# Patient Record
Sex: MALE | Race: WHITE
[De-identification: names, ages, dates, MRNs, and addresses within clinical notes are randomized per-mention and may not be internally consistent; named-entity substitution may affect disease eponyms.]

---

## 2017-08-09 LAB
ANION GAP SERPL CALC-SCNC: 3 MMOL/L (ref 3–11)
APPEARANCE UR: CLEAR
BASOPHILS # BLD: 0.03 K/UL (ref 0–0.2)
BASOPHILS NFR BLD: 0.4 %
BILIRUB UR-MCNC: (no result) MG/DL
BUN SERPL-MCNC: 16 MG/DL (ref 7–18)
BUN/CREAT SERPL: 16 (ref 10–20)
CALCIUM SERPL-MCNC: 8.4 MG/DL (ref 8.5–10.1)
CHLORIDE SERPL-SCNC: 111 MMOL/L (ref 98–107)
CO2 SERPL-SCNC: 31 MMOL/L (ref 21–32)
COLOR UR: YELLOW
COMPLETE: YES
CREAT CL PREDICTED SERPL C-G-VRATE: 96.8 ML/MIN
CREAT SERPL-MCNC: 1 MG/DL (ref 0.6–1.4)
EOSINOPHIL NFR BLD AUTO: 184 K/UL (ref 130–400)
GLUCOSE SERPL-MCNC: 87 MG/DL (ref 70–99)
HCT VFR BLD CALC: 41.7 % (ref 42–52)
IG%: 0.3 %
IMM GRANULOCYTES NFR BLD AUTO: 27.2 %
LYMPHOCYTES # BLD: 1.89 K/UL (ref 1.2–3.4)
MANUAL MICROSCOPIC REQUIRED?: NO
MCH RBC QN AUTO: 32 PG (ref 25–34)
MCHC RBC AUTO-ENTMCNC: 33.3 G/DL (ref 32–36)
MCV RBC AUTO: 95.9 FL (ref 80–100)
MONOCYTES NFR BLD: 8.5 %
NEUTROPHILS # BLD AUTO: 3.7 %
NEUTROPHILS NFR BLD AUTO: 59.9 %
NITRITE UR QL STRIP: (no result)
PH UR STRIP: 5.5 [PH] (ref 4.5–7.5)
PMV BLD AUTO: 12.3 FL (ref 7.4–10.4)
POTASSIUM SERPL-SCNC: 3.6 MMOL/L (ref 3.5–5.1)
RBC # BLD AUTO: 4.35 M/UL (ref 4.7–6.1)
REVIEW REQ?: NO
SODIUM SERPL-SCNC: 145 MMOL/L (ref 136–145)
SP GR UR STRIP: 1.03 (ref 1–1.03)
URINE BILL WITH OR WITHOUT MIC: (no result)
UROBILINOGEN UR-MCNC: (no result) MG/DL
WBC # BLD AUTO: 6.95 K/UL (ref 4.8–10.8)
ZZUR CULT IF INDIC CLEAN CATCH: NO

## 2017-08-09 NOTE — DIAGNOSTIC IMAGING REPORT
CHEST 2 VIEWS ROUTINE



CLINICAL HISTORY: PAT preoperative evaluation



COMPARISON STUDY:  4/1/2012



FINDINGS: The bones soft tissues and hemidiaphragms are normal. The

cardiomediastinal silhouette is normal. The lungs are clear. The pulmonary

vasculature is normal. Several metallic coils left lung base.



IMPRESSION:  No acute process. 











The above report was generated using voice recognition software.  It may contain

grammatical, syntax or spelling errors.









Electronically signed by:  Dante Barnett M.D.

8/9/2017 3:24 PM



Dictated Date/Time:  8/9/2017 3:22 PM

## 2017-08-09 NOTE — PAT MEDICATION INSTRUCTIONS
Service Date


Aug 9, 2017.





Current Home Medication List


Amoxicillin (Amoxil), 500 MG PO UD PRN for DENTAL PROCEDURE


Aspirin Enteric Coated (Ecotrin Or Generic), 81 MG PO QAM


Cyanocobalamin (Vitamin B12), 1 TAB PO QAM


Ferrous Sulfate (Iron), 1 TAB PO HS


Losartan Potassium (Cozaar), 50 MG PO QAM


Multivitamin (Multivitamin), 1 TAB PO QAM


Pravastatin (Pravachol ), 10 MG PO HS


Ranitidine (Zantac), 150 MG PO PRN


Sulindac (Sulindac), 1 TAB PO HS


[Zinc], 50 MG PO HS





Medication Instructions


For Your Scheduled Surgery 





Amoxicillin (Amoxil), 500 MG PO UD PRN for DENTAL PROCEDURE 








- Check with surgeon for instructions: 


Sulindac (Sulindac), 1 TAB PO HS








- Hold the following medications the morning of surgery:


Ranitidine (Zantac), 150 MG PO PRN


Multivitamin (Multivitamin), 1 TAB PO QAM


Losartan Potassium (Cozaar), 50 MG PO QAM


Cyanocobalamin (Vitamin B12), 1 TAB PO QAM








- Take the following medications the morning of surgery with a sip of water:


Aspirin Enteric Coated (Ecotrin Or Generic), 81 MG PO QAM (okay to continue per 

surgeon)








- Take the following medications as scheduled the night before surgery:


[Zinc], 50 MG PO HS


Pravastatin (Pravachol ), 10 MG PO HS


Ranitidine (Zantac), 150 MG PO PRN (if needed)


Ferrous Sulfate (Iron), 1 TAB PO HS








If you have any questions please call us at [phone] or [phone] or 

[phone]

## 2017-08-30 ENCOUNTER — HOSPITAL ENCOUNTER (INPATIENT)
Dept: HOSPITAL 45 - C.ACU | Age: 62
LOS: 2 days | Discharge: HOME | DRG: 460 | End: 2017-09-01
Attending: ORTHOPAEDIC SURGERY | Admitting: ORTHOPAEDIC SURGERY
Payer: COMMERCIAL

## 2017-08-30 VITALS
DIASTOLIC BLOOD PRESSURE: 80 MMHG | SYSTOLIC BLOOD PRESSURE: 143 MMHG | HEART RATE: 80 BPM | OXYGEN SATURATION: 93 % | TEMPERATURE: 97.52 F

## 2017-08-30 VITALS
HEART RATE: 67 BPM | DIASTOLIC BLOOD PRESSURE: 74 MMHG | SYSTOLIC BLOOD PRESSURE: 115 MMHG | TEMPERATURE: 97.34 F | OXYGEN SATURATION: 95 %

## 2017-08-30 VITALS
SYSTOLIC BLOOD PRESSURE: 149 MMHG | HEART RATE: 72 BPM | DIASTOLIC BLOOD PRESSURE: 81 MMHG | TEMPERATURE: 97.7 F | OXYGEN SATURATION: 95 %

## 2017-08-30 VITALS
OXYGEN SATURATION: 93 % | TEMPERATURE: 97.52 F | DIASTOLIC BLOOD PRESSURE: 80 MMHG | HEART RATE: 80 BPM | SYSTOLIC BLOOD PRESSURE: 143 MMHG

## 2017-08-30 VITALS
SYSTOLIC BLOOD PRESSURE: 150 MMHG | DIASTOLIC BLOOD PRESSURE: 75 MMHG | HEART RATE: 58 BPM | TEMPERATURE: 97.34 F | OXYGEN SATURATION: 94 %

## 2017-08-30 VITALS
WEIGHT: 251.11 LBS | BODY MASS INDEX: 35.95 KG/M2 | WEIGHT: 251.11 LBS | HEIGHT: 70 IN | HEIGHT: 70 IN | BODY MASS INDEX: 35.95 KG/M2 | BODY MASS INDEX: 35.95 KG/M2

## 2017-08-30 VITALS
SYSTOLIC BLOOD PRESSURE: 148 MMHG | DIASTOLIC BLOOD PRESSURE: 79 MMHG | HEART RATE: 75 BPM | OXYGEN SATURATION: 95 % | TEMPERATURE: 97.34 F

## 2017-08-30 VITALS
OXYGEN SATURATION: 95 % | SYSTOLIC BLOOD PRESSURE: 151 MMHG | TEMPERATURE: 97.52 F | HEART RATE: 84 BPM | DIASTOLIC BLOOD PRESSURE: 97 MMHG

## 2017-08-30 VITALS — DIASTOLIC BLOOD PRESSURE: 89 MMHG | SYSTOLIC BLOOD PRESSURE: 154 MMHG | HEART RATE: 65 BPM | OXYGEN SATURATION: 93 %

## 2017-08-30 VITALS
OXYGEN SATURATION: 95 % | HEART RATE: 60 BPM | DIASTOLIC BLOOD PRESSURE: 99 MMHG | SYSTOLIC BLOOD PRESSURE: 144 MMHG | TEMPERATURE: 97.7 F

## 2017-08-30 VITALS
HEART RATE: 93 BPM | SYSTOLIC BLOOD PRESSURE: 156 MMHG | DIASTOLIC BLOOD PRESSURE: 83 MMHG | TEMPERATURE: 97.34 F | OXYGEN SATURATION: 95 %

## 2017-08-30 VITALS — OXYGEN SATURATION: 93 %

## 2017-08-30 DIAGNOSIS — Z79.82: ICD-10-CM

## 2017-08-30 DIAGNOSIS — M48.06: Primary | ICD-10-CM

## 2017-08-30 PROCEDURE — 0SG10J1 FUSION OF 2 OR MORE LUMBAR VERTEBRAL JOINTS WITH SYNTHETIC SUBSTITUTE, POSTERIOR APPROACH, POSTERIOR COLUMN, OPEN APPROACH: ICD-10-PCS | Performed by: ORTHOPAEDIC SURGERY

## 2017-08-30 PROCEDURE — 0ST20ZZ RESECTION OF LUMBAR VERTEBRAL DISC, OPEN APPROACH: ICD-10-PCS | Performed by: ORTHOPAEDIC SURGERY

## 2017-08-30 RX ADMIN — SODIUM CHLORIDE SCH MLS/HR: 900 INJECTION, SOLUTION INTRAVENOUS at 18:09

## 2017-08-30 RX ADMIN — SULINDAC SCH MG: 200 TABLET ORAL at 20:41

## 2017-08-30 RX ADMIN — STANDARDIZED SENNA CONCENTRATE AND DOCUSATE SODIUM SCH TAB: 8.6; 5 TABLET ORAL at 20:42

## 2017-08-30 RX ADMIN — DEXAMETHASONE SODIUM PHOSPHATE SCH MLS/MIN: 4 INJECTION, SOLUTION INTRA-ARTICULAR; INTRALESIONAL; INTRAMUSCULAR; INTRAVENOUS; SOFT TISSUE at 18:05

## 2017-08-30 RX ADMIN — PRAVASTATIN SODIUM SCH MG: 20 TABLET ORAL at 20:42

## 2017-08-30 RX ADMIN — HYDROMORPHONE HYDROCHLORIDE PRN MG: 10 INJECTION, SOLUTION INTRAMUSCULAR; INTRAVENOUS; SUBCUTANEOUS at 12:05

## 2017-08-30 RX ADMIN — SODIUM CHLORIDE SCH MLS/HR: 900 INJECTION, SOLUTION INTRAVENOUS at 12:20

## 2017-08-30 RX ADMIN — CEFAZOLIN SCH MLS/HR: 10 INJECTION, POWDER, FOR SOLUTION INTRAVENOUS at 18:09

## 2017-08-30 RX ADMIN — HYDROMORPHONE HYDROCHLORIDE PRN MG: 10 INJECTION, SOLUTION INTRAMUSCULAR; INTRAVENOUS; SUBCUTANEOUS at 23:05

## 2017-08-30 NOTE — [facility] OPERATIVE REPORT
Operative Report


Operative Date


Aug 30, 2017.





Pre-Operative Diagnosis





Lumbar Spinal Stenosis





Post-Operative Diagnosis





Lumbar Spinal Stenosis





Procedure(s) Performed





#1 lumbar decompression medial facetectomies foraminotomies L3 4 L4 5


L5-S1.  #2 posterior spinal fusion L4 5 L5-S1.  #3 placement posterior


segmental instrumentation of L4 5 L5-S1.  #4 number fusion L4 5.  #5


placement peek cage 12 x 26 mm at L4 5.  6 placement of locally harvested


morcellized autograft in the posterior lateral gutters.  #7 placement of


osteoamp in the interbody space and posterior lateral gutters.





Surgeon


Dr. Mcfadden





Assistant Surgeon(s)


DWIGHT Jewell





Estimated Blood Loss


400ml





Findings


Severe spinal stenosis





Specimens





none per surgeon





Description of Procedure


Patient was met with preoperatively case discussed all questions are dressed.  

After informed consent patient was taken to the operative suite and underwent 

intubation placed in a prone position the Flavio table on top of the Yobani 

frame.  All bony promises well-padded eyes inspected to ensure there is no 

external pressure placed upon them.  This point the number spine was prepped 

and draped in the normal sterile fashion.  Sharp dissection with the assistance 

of Bovie cautery was performed onto an exposing the lamina and transverse 

processes of L4 L5 and the S1 levels bilaterally.  From a caudal to cephalad 

fashion complete laminectomy of L5 L4 partial laminectomy of L3 was performed 

addressing severe lateral recess and foraminal stenosis.  This included is is 

herniation at L5-S1 on the right.  After complete decompression pedicle screws 

were placed in L4-L5 and S1 levels bilaterally with the assistance of 

fluoroscopy in the properly sized sendy placed.  Through a transforaminal 

approach on the right a complete discectomy of L4 5 was performed and plate 

created to subcortical bleeding bone and a 12 x 26 mm peek cage filled with 

osteoamp tapped in position.  Brought in and locked and final position 

bilaterally.  Transverse processes of L4-L5 and sacral alar burred to 

subcortical bleeding bone.  The remaining bone graft locally harvested 

morcellized autograft was placed in the posterior lateral gutters.  A 15 round 

STEPH drain was inserted.  Incision was closed with 1 Vicryl in the fascia 2-0 

Vicryl subcutaneously for Monocryl for final skin closure.  Steri-Strips 

sterile dressing placed patient awakened and taken to PACU stable condition.  

Please note Mikayla Salas was present at the entire procedure involved in patient 

positioning complex portions of the procedure and final skin closure.


I attest to the content of the Intraoperative Record and any orders documented 

therein.  Any exceptions are noted below.

## 2017-08-30 NOTE — DIAGNOSTIC IMAGING REPORT
LUMBAR SPINE 2 OR 3 VIEW



HISTORY:  62 years-old Male L4-S1 DECOMPRESSION AND FUSION AND INTERBODY 



COMPARISON: None available



TECHNIQUE: Frontal and lateral spot fluoroscopic images of the lumbar spine were

obtained utilizing 20.7 seconds fluoroscopy time.



FINDINGS: 

There has been interval posterior decompression with posterior interbody sendy and

screw fusion at the L4-S1 levels. Discectomy changes are seen at L4-L5. Endplate

spurring is seen at these levels with facet arthropathy. Alignment is

satisfactory.



IMPRESSION: 

Status post posterior decompression with interbody sendy and screw fusion at

L4-S1. Discectomy changes noted at L4-L5. Satisfactory alignment. 







The above report was generated using voice recognition software. It may contain

grammatical, syntax or spelling errors.







Electronically signed by:  Jim Hernández M.D.

8/30/2017 9:48 AM



Dictated Date/Time:  8/30/2017 9:46 AM

## 2017-08-30 NOTE — ANESTHESIOLOGY PROGRESS NOTE
Anesthesia Post Op Note


Date & Time


Aug 30, 2017 at 11:18





Vital Signs


Pain Intensity:  4





Vital Signs Past 12 Hours








  Date Time  Temp Pulse Resp B/P (MAP) Pulse Ox O2 Delivery O2 Flow Rate FiO2


 


8/30/17 10:55  62 16 134/88 96 Nasal Cannula 2 


 


8/30/17 10:45  60 16 151/89 94 Nasal Cannula 2 


 


8/30/17 10:35  61 16 168/103 94 Nasal Cannula 2 


 


8/30/17 10:25  66 16 159/106 94 Oxymask 10 


 


8/30/17 10:15 37.1 68 14 156/93 97 Oxymask 10 


 


8/30/17 10:00 37.1 73 16 135/82 95 Oxymask 10 


 


8/30/17 06:05 36.5 60 20 144/99 95 Room Air  











Notes


Mental Status:  alert / awake / arousable, participated in evaluation


Pt Amnestic to Procedure:  Yes


Nausea / Vomiting:  adequately controlled


Pain:  adequately controlled


Airway Patency, RR, SpO2:  stable & adequate


BP & HR:  stable & adequate


Hydration State:  stable & adequate


Anesthetic Complications:  no major complications apparent

## 2017-08-30 NOTE — DISCHARGE INSTRUCTIONS
Discharge Instructions


Date of Service


Aug 30, 2017.





Admission


Reason for Admission:  Lumbar Spinal Stenosis





Discharge


Discharge Diagnosis / Problem:  lumbar stenosis





Discharge Goals


Goal(s):  Improve function





Activity Recommendations


Activity Limitations:  per Instructions/Follow-up section





.





Current Hospital Diet


Patient's current hospital diet: Regular Diet





Discharge Diet


Recommended Diet:  Regular Diet





Procedures


Procedures Performed:  


#1 lumbar decompression medial facetectomies foraminotomies L3 4 L4 5


L5-S1.  #2 posterior spinal fusion L4 5 L5-S1.  #3 placement posterior


segmental instrumentation of L4 5 L5-S1.  #4 number fusion L4 5.  #5


placement peek cage 12 x 26 mm at L4 5.  6 placement of locally harvested


morcellized autograft in the posterior lateral gutters.  #7 placement of


osteoamp in the interbody space and posterior lateral gutters.





Pending Studies


Studies pending at discharge:  no





Medical Emergencies








.


Who to Call and When:





Medical Emergencies:  If at any time you feel your situation is an emergency, 

please call 911 immediately.





.





Non-Emergent Contact


Non-Emergency issues call your:  Primary Care Provider


.








"Provider Documentation" section prepared by Ben Mcfadden.








.





VTE Core Measure


Inpt VTE Proph given/why not?:  T.E.D. Stockings, SCD's

## 2017-08-30 NOTE — HISTORY AND PHYSICAL
History & Physical


Date


Aug 30, 2017.





Chief Complaint


Back and leg pain





History of Present Illness


The patient is a 62 year old male with complaints of back and leg pain





Additional History


Hepatic Disease:  No


Endocrine Disorder:  No


Kidney Disease:  No


Hypertension:  No


Heart Disease:  No


Bleeding Tendencies:  No


Infectious Diseases:  No





Allergies


Coded Allergies:  


     Mushroom Extract Complex (Verified  Allergy, Unknown, ANAPHYLAXIS, 8/30/17)


 PT STATES HE IS TOLD TO STAY AWAY FROM ALL MUSHROMS


     NO KNOWN DRUG ALLERGIES (Verified  Allergy, Unknown, NKDA, 8/30/17)





Home Medications


Scheduled


Aspirin Enteric Coated (Ecotrin Or Generic), 81 MG PO QAM


Cyanocobalamin (Vitamin B12), 1 TAB PO QAM


Ferrous Sulfate (Iron), 1 TAB PO HS


Losartan Potassium (Cozaar), 50 MG PO QAM


Multivitamin (Multivitamin), 1 TAB PO QAM


Pravastatin (Pravachol ), 10 MG PO HS


Ranitidine (Zantac), 150 MG PO PRN


Sulindac (Sulindac), 1 TAB PO HS


[Zinc], 50 MG PO HS





Scheduled PRN


Amoxicillin (Amoxil), 500 MG PO UD PRN for DENTAL PROCEDURE





Physical Examination


Skin:  warm/dry, no rash


Eyes:  normal inspection, EOMI, sclerae normal


ENT:  normal ENT inspection, pharynx normal


Head:  normocephalic, atraumatic


Neck:  supple, no adenopathy, trachea midline


Respiratory/Chest:  lungs clear, normal breath sounds, no respiratory distress


Cardiovascular:  regular rate, rhythm, no edema, no murmur


Abdomen / GI:  normal bowel sounds, non tender


Back:  normal inspection


Extremities:  normal inspection, normal range of motion


Neurologic/Psych:  no motor/sensory deficits, alert, normal reflexes, oriented 

x 3





Diagnosis


Lumbar spinal stenosis





Plan of Treatment


Lumbar decompression and fusion L4 to S1

## 2017-08-31 VITALS
TEMPERATURE: 98.24 F | OXYGEN SATURATION: 97 % | HEART RATE: 68 BPM | SYSTOLIC BLOOD PRESSURE: 150 MMHG | DIASTOLIC BLOOD PRESSURE: 78 MMHG

## 2017-08-31 VITALS
DIASTOLIC BLOOD PRESSURE: 78 MMHG | SYSTOLIC BLOOD PRESSURE: 139 MMHG | HEART RATE: 63 BPM | OXYGEN SATURATION: 95 % | TEMPERATURE: 97.88 F

## 2017-08-31 VITALS
TEMPERATURE: 97.88 F | SYSTOLIC BLOOD PRESSURE: 105 MMHG | DIASTOLIC BLOOD PRESSURE: 64 MMHG | HEART RATE: 73 BPM | OXYGEN SATURATION: 93 %

## 2017-08-31 VITALS
HEART RATE: 79 BPM | TEMPERATURE: 99.14 F | DIASTOLIC BLOOD PRESSURE: 86 MMHG | OXYGEN SATURATION: 92 % | SYSTOLIC BLOOD PRESSURE: 121 MMHG

## 2017-08-31 VITALS
HEART RATE: 78 BPM | TEMPERATURE: 98.24 F | DIASTOLIC BLOOD PRESSURE: 84 MMHG | OXYGEN SATURATION: 95 % | SYSTOLIC BLOOD PRESSURE: 176 MMHG

## 2017-08-31 VITALS — SYSTOLIC BLOOD PRESSURE: 153 MMHG | HEART RATE: 71 BPM | DIASTOLIC BLOOD PRESSURE: 78 MMHG

## 2017-08-31 VITALS — OXYGEN SATURATION: 95 %

## 2017-08-31 LAB
ANION GAP SERPL CALC-SCNC: 5 MMOL/L (ref 3–11)
BASOPHILS # BLD: 0 K/UL (ref 0–0.2)
BASOPHILS NFR BLD: 0 %
BUN SERPL-MCNC: 17 MG/DL (ref 7–18)
BUN/CREAT SERPL: 16.9 (ref 10–20)
CALCIUM SERPL-MCNC: 7.1 MG/DL (ref 8.5–10.1)
CHLORIDE SERPL-SCNC: 108 MMOL/L (ref 98–107)
CO2 SERPL-SCNC: 27 MMOL/L (ref 21–32)
COMPLETE: YES
CREAT CL PREDICTED SERPL C-G-VRATE: 96.8 ML/MIN
CREAT SERPL-MCNC: 1 MG/DL (ref 0.6–1.4)
EOSINOPHIL NFR BLD AUTO: 188 K/UL (ref 130–400)
GLUCOSE SERPL-MCNC: 137 MG/DL (ref 70–99)
HCT VFR BLD CALC: 35.1 % (ref 42–52)
IG%: 0.3 %
IMM GRANULOCYTES NFR BLD AUTO: 5.1 %
LYMPHOCYTES # BLD: 0.85 K/UL (ref 1.2–3.4)
MCH RBC QN AUTO: 31.4 PG (ref 25–34)
MCHC RBC AUTO-ENTMCNC: 32.8 G/DL (ref 32–36)
MCV RBC AUTO: 95.9 FL (ref 80–100)
MONOCYTES NFR BLD: 3.6 %
NEUTROPHILS # BLD AUTO: 0 %
NEUTROPHILS NFR BLD AUTO: 91 %
PMV BLD AUTO: 12.2 FL (ref 7.4–10.4)
POTASSIUM SERPL-SCNC: 4.1 MMOL/L (ref 3.5–5.1)
RBC # BLD AUTO: 3.66 M/UL (ref 4.7–6.1)
SODIUM SERPL-SCNC: 140 MMOL/L (ref 136–145)
WBC # BLD AUTO: 16.55 K/UL (ref 4.8–10.8)

## 2017-08-31 RX ADMIN — OXYCODONE HYDROCHLORIDE PRN MG: 5 TABLET ORAL at 19:54

## 2017-08-31 RX ADMIN — Medication SCH MG: at 08:28

## 2017-08-31 RX ADMIN — OXYCODONE HYDROCHLORIDE PRN MG: 5 TABLET ORAL at 23:59

## 2017-08-31 RX ADMIN — DEXAMETHASONE SODIUM PHOSPHATE SCH MLS/MIN: 4 INJECTION, SOLUTION INTRA-ARTICULAR; INTRALESIONAL; INTRAMUSCULAR; INTRAVENOUS; SOFT TISSUE at 10:20

## 2017-08-31 RX ADMIN — CEFAZOLIN SCH MLS/HR: 10 INJECTION, POWDER, FOR SOLUTION INTRAVENOUS at 01:17

## 2017-08-31 RX ADMIN — OXYCODONE HYDROCHLORIDE PRN MG: 5 TABLET ORAL at 08:28

## 2017-08-31 RX ADMIN — OXYCODONE HYDROCHLORIDE PRN MG: 5 TABLET ORAL at 13:07

## 2017-08-31 RX ADMIN — PRAVASTATIN SODIUM SCH MG: 20 TABLET ORAL at 21:40

## 2017-08-31 RX ADMIN — SODIUM CHLORIDE SCH MLS/HR: 900 INJECTION, SOLUTION INTRAVENOUS at 01:17

## 2017-08-31 RX ADMIN — HYDROMORPHONE HYDROCHLORIDE PRN MG: 1 INJECTION, SOLUTION INTRAMUSCULAR; INTRAVENOUS; SUBCUTANEOUS at 12:09

## 2017-08-31 RX ADMIN — STANDARDIZED SENNA CONCENTRATE AND DOCUSATE SODIUM SCH TAB: 8.6; 5 TABLET ORAL at 21:41

## 2017-08-31 RX ADMIN — SULINDAC SCH MG: 200 TABLET ORAL at 21:40

## 2017-08-31 RX ADMIN — LOSARTAN POTASSIUM SCH MG: 50 TABLET, FILM COATED ORAL at 08:28

## 2017-08-31 RX ADMIN — DEXAMETHASONE SODIUM PHOSPHATE SCH MLS/MIN: 4 INJECTION, SOLUTION INTRA-ARTICULAR; INTRALESIONAL; INTRAMUSCULAR; INTRAVENOUS; SOFT TISSUE at 01:17

## 2017-08-31 RX ADMIN — HYDROMORPHONE HYDROCHLORIDE PRN MG: 1 INJECTION, SOLUTION INTRAMUSCULAR; INTRAVENOUS; SUBCUTANEOUS at 21:44

## 2017-08-31 NOTE — PROGRESS NOTE
Progress Note


Date of Service


Aug 31, 2017.





Progress Note


Patient back pain is relatively well controlled some left upper buttock 

discomfort only.  On exam he is good strength testing appears comfortable.  

Assessment status post lumbar depression fusion replant this time initiate 

physical therapy advance his bowel regimen anticipate possible home tomorrow 

evening

## 2017-08-31 NOTE — ANESTHESIOLOGY PROGRESS NOTE
Anesthesia Post Op Note


Date & Time


Aug 31, 2017 at 12:57





Vital Signs


Pain Intensity:  3.0





Vital Signs Past 12 Hours








  Date Time  Temp Pulse Resp B/P (MAP) Pulse Ox O2 Delivery O2 Flow Rate FiO2


 


8/31/17 10:55 36.8 68 18 150/78 (102) 97 Room Air  


 


8/31/17 07:38 36.6 63 19 139/78 (98) 95 Room Air  


 


8/31/17 07:15      Room Air  


 


8/31/17 03:18 36.6 73 17 105/64 (78) 93 Room Air  











Notes


Mental Status:  alert / awake / arousable, participated in evaluation


Anesthetic Complications:  no major complications apparent

## 2017-09-01 VITALS
OXYGEN SATURATION: 96 % | TEMPERATURE: 97.88 F | DIASTOLIC BLOOD PRESSURE: 98 MMHG | SYSTOLIC BLOOD PRESSURE: 146 MMHG | HEART RATE: 73 BPM

## 2017-09-01 VITALS
TEMPERATURE: 97.88 F | OXYGEN SATURATION: 96 % | SYSTOLIC BLOOD PRESSURE: 146 MMHG | HEART RATE: 73 BPM | DIASTOLIC BLOOD PRESSURE: 98 MMHG

## 2017-09-01 RX ADMIN — Medication SCH GM: at 05:36

## 2017-09-01 RX ADMIN — OXYCODONE HYDROCHLORIDE PRN MG: 5 TABLET ORAL at 13:56

## 2017-09-01 RX ADMIN — Medication SCH GM: at 12:00

## 2017-09-01 RX ADMIN — OXYCODONE HYDROCHLORIDE PRN MG: 5 TABLET ORAL at 05:40

## 2017-09-01 RX ADMIN — LOSARTAN POTASSIUM SCH MG: 50 TABLET, FILM COATED ORAL at 07:46

## 2017-09-01 RX ADMIN — Medication SCH MG: at 07:46

## 2017-09-01 NOTE — DISCHARGE SUMMARY
Orthopedic Discharge Summary


Admission Date/Reason


Aug 30, 2017 at 07:30


Lumbar Spinal Stenosis.





Discharge Date/Disposition


Sep 1, 2017


Home with services





Diagnosis


Principal Diagnosis:


Lumbar spinal stenosis





Admission Physical Exam


As per Admitting History & Physical.





Hospital Course


Patient underwent lumbar decompression fusion tolerated this well as taken to 

the orthopedic floor postop we.  He was up and amatory progressed nicely 

throughout his postoperative course.  Socially discharge home.  Discharge 

orders and instructions found on the chart for further review.





Discharge Instructions


Please refer to the electronic Patient Visit Report (Discharge Instructions) 

for additional information.

## 2017-09-01 NOTE — DISCHARGE INSTRUCTIONS
Discharge Instructions


Date of Service


Sep 1, 2017.





Admission


Reason for Admission:  Lumbar Spinal Stenosis





Discharge


Discharge Diagnosis / Problem:  stenosis





Discharge Goals


Goal(s):  Improve function





Activity Recommendations


Activity Limitations:  per Instructions/Follow-up section





.





Instructions / Follow-Up


Instructions / Follow-Up





ACTIVITY RECOMMENDATIONS:





SELF CARE INSTRUCTIONS AFTER THORACIC/LUMBAR FUSIONS





1.  You may walk to your tolerance.  It is good exercise for your legs and back.


      Expect some back and intermittent leg aches and pains.





2.  You may perform "counter-top" level activities (make a sandwich, shawn 

with a


     project, etc.).





3.  No bending or lifting of more than 10 pounds or back twisting of any nature 

(roll


      like a log when turning in bed).





4.  You may ride in a car for 20-30 minutes at a time.  No driving until after 

your


      first visit with your doctor.





5.  Frequent changes of position and restricting sitting to 30 minutes at a 

time will


      help limit the amount of back spasms and stiffness you may experience.





6.  You may discontinue the use of ambulatory aids (cane, crutches, etc.) once 

your


      strength and confidence allow.





7.  You may  the shower and let water strike your incision when you 

arrive 


     home at least once daily.  Do not take a tub bath, sit in a hot tub or go 

into a


     swimming pool until after your first recheck in the office.








SPECIAL CARE INSTRUCTIONS:





**VERY IMPORTANT TO READ AND REVIEW**





A.  Your surgical incision has been closed with a cosmetic suture under the 


     skin that will dissolve in about 6 weeks.  In 14 days, you can use a pair 


     of clean scissors and cut the suture that is left outside of the skin at 

the 


     ends of your incision.


   1.  The small skin tapes can be removed 7 days after surgery if they 


               have not fallen off by that point.


   2.  You may keep the wound open to air as much as possible to promote


              healing after post-op day number 5 unless told otherwise by your 

doctor.


   3.  If you think the wound looks like it is becoming infected (redness or 


              worsening drainage) and/or you are experiencing fever, chill or 

worsening


              back pain and muscle spasms, contact the office so that we may 

evaluate


              you as soon as possible.





B.  Complications are uncommon, but please contact us if you have any signs or 


     symptoms of:


   1.  wound infection (fever higher than 102.5 degrees F, redness, separation


              of wound, drainage, or increasing pain from the incision) 


   2.  blood clots in legs (pain, swelling, redness and warmth in legs)


   3.  urinary tract infection (fever higher than 102.5 degrees F, burning upon


              urination or increased frequency of urination)


   4.  nerve problems (inability to walk on your toes or heels, numbness, loss 


              of bowel or bladder control)


   5.  any other symptoms that concern you





C.  Please call the office at [phone] if you have any concerns or 

questions


     about your operation or recovery.





D.  No smoking!  Smoking drastically decreases the chance of a solid fusion.





E.  Do not take any anti-inflammatory medications (Indocin, Advil, Motrin, 

Aspirin, 


     Naprosyn, etc.) as these may inhibit the chance of a solid fusion.  

Tylenol is


     okay to take for pain.








MANAGING PAIN AFTER SPINAL SURGERY





1.  Narcotic medication is intended for short-term use and will be provided for 


     surgical pain.  Surgical pain usually lasts for a period of 4-6 weeks.  

Narcotic 


     medication includes Percocet, Vicodin, Darvocet, Tylenol #3 or Lortab.





2.  Longer-term pain is more appropriately treated with non-narcotic medication 


    such as Tylenol ES.





3.  Muscle spasm is not appropriately treated with narcotics.  Muscle relaxers 

such


    as Soma, Flexeril or Skelaxin can be used along with Tylenol ES.





4.  Remember that we all live with some "aches and pains".  This is not unusual 

or 


     uncommon after an injury or as we get older.


   a.  Back pain is expected and may include muscle spasms for 4 to 6 weeks 


              after surgery.  The pain should gradually improve.  If the pain 

worsens for 


              no apparent reason, please contact the office.


   b.  Intermittent leg pain may also be experienced and should not be concerned


        about unless it worsens for no apparent reason.  If so, please contact 

the


               office.





5.  We will provide appropriate medication within the normal guidelines of 

their prescribed


     use.  We will also be very cautious and aware of potential abuse and 

extended


     duration of patients' medication needs.


   a.  Pain medications are for your comfort and to assist with sleep and


        rest so that the tissue can heal.  They are not provided in order to 

return 


              to normal activity and should not be used through the day.  To do 

so or 


              worsening pain at night can result from ongoing tissue damage and 

development


              of tolerance to the prescribed medicine.





6.  Please allow 2-3 days to process refills.  Prescriptions will not be mailed 

but must be


     picked up at the office.








FOLLOW UP VISIT:





Keep your scheduled follow-up appointment. 





Any questions, please call the office at [phone].





Current Hospital Diet


Patient's current hospital diet: Regular Diet





Discharge Diet


Recommended Diet:  Regular Diet





Procedures


Procedures Performed:  


#1 lumbar decompression medial facetectomies foraminotomies L3 4 L4 5


L5-S1.  #2 posterior spinal fusion L4 5 L5-S1.  #3 placement posterior


segmental instrumentation of L4 5 L5-S1.  #4 number fusion L4 5.  #5


placement peek cage 12 x 26 mm at L4 5.  6 placement of locally harvested


morcellized autograft in the posterior lateral gutters.  #7 placement of


osteoamp in the interbody space and posterior lateral gutters.





Pending Studies


Studies pending at discharge:  no





Medical Emergencies








.


Who to Call and When:





Medical Emergencies:  If at any time you feel your situation is an emergency, 

please call 911 immediately.





.





Non-Emergent Contact


Non-Emergency issues call your:  Primary Care Provider


.








"Provider Documentation" section prepared by Ben Mcfadden.








.





VTE Core Measure


Inpt VTE Proph given/why not?:  T.E.D. Stockings, VIVIAN's

## 2018-04-30 ENCOUNTER — HOSPITAL ENCOUNTER (INPATIENT)
Dept: HOSPITAL 45 - C.EDB | Age: 63
LOS: 2 days | Discharge: HOME | DRG: 694 | End: 2018-05-02
Attending: HOSPITALIST | Admitting: HOSPITALIST
Payer: COMMERCIAL

## 2018-04-30 VITALS
WEIGHT: 241.63 LBS | BODY MASS INDEX: 34.59 KG/M2 | HEIGHT: 70 IN | BODY MASS INDEX: 34.59 KG/M2 | WEIGHT: 241.63 LBS | HEIGHT: 70 IN

## 2018-04-30 DIAGNOSIS — E53.8: ICD-10-CM

## 2018-04-30 DIAGNOSIS — N17.9: ICD-10-CM

## 2018-04-30 DIAGNOSIS — Z79.899: ICD-10-CM

## 2018-04-30 DIAGNOSIS — Z86.73: ICD-10-CM

## 2018-04-30 DIAGNOSIS — F17.210: ICD-10-CM

## 2018-04-30 DIAGNOSIS — N13.2: Primary | ICD-10-CM

## 2018-04-30 DIAGNOSIS — E78.5: ICD-10-CM

## 2018-04-30 DIAGNOSIS — G89.29: ICD-10-CM

## 2018-04-30 DIAGNOSIS — Z91.018: ICD-10-CM

## 2018-04-30 DIAGNOSIS — M54.5: ICD-10-CM

## 2018-04-30 DIAGNOSIS — I10: ICD-10-CM

## 2018-04-30 DIAGNOSIS — Z79.82: ICD-10-CM

## 2018-04-30 LAB
ALBUMIN SERPL-MCNC: 3.5 GM/DL (ref 3.4–5)
ALP SERPL-CCNC: 69 U/L (ref 45–117)
ALT SERPL-CCNC: 28 U/L (ref 12–78)
AST SERPL-CCNC: 22 U/L (ref 15–37)
BASOPHILS # BLD: 0.03 K/UL (ref 0–0.2)
BASOPHILS NFR BLD: 0.3 %
BUN SERPL-MCNC: 18 MG/DL (ref 7–18)
CALCIUM SERPL-MCNC: 8.4 MG/DL (ref 8.5–10.1)
CO2 SERPL-SCNC: 26 MMOL/L (ref 21–32)
CREAT SERPL-MCNC: 1.67 MG/DL (ref 0.6–1.4)
EOS ABS #: 0.2 K/UL (ref 0–0.5)
EOSINOPHIL NFR BLD AUTO: 189 K/UL (ref 130–400)
GLUCOSE SERPL-MCNC: 107 MG/DL (ref 70–99)
HCT VFR BLD CALC: 39.6 % (ref 42–52)
HGB BLD-MCNC: 13.6 G/DL (ref 14–18)
IG#: 0.03 K/UL (ref 0–0.02)
IMM GRANULOCYTES NFR BLD AUTO: 14.7 %
LIPASE: 158 U/L (ref 73–393)
LYMPHOCYTES # BLD: 1.69 K/UL (ref 1.2–3.4)
MCH RBC QN AUTO: 32.3 PG (ref 25–34)
MCHC RBC AUTO-ENTMCNC: 34.3 G/DL (ref 32–36)
MCV RBC AUTO: 94.1 FL (ref 80–100)
MONO ABS #: 0.79 K/UL (ref 0.11–0.59)
MONOCYTES NFR BLD: 6.9 %
NEUT ABS #: 8.73 K/UL (ref 1.4–6.5)
NEUTROPHILS # BLD AUTO: 1.7 %
NEUTROPHILS NFR BLD AUTO: 76.1 %
PMV BLD AUTO: 11.1 FL (ref 7.4–10.4)
POTASSIUM SERPL-SCNC: 3.6 MMOL/L (ref 3.5–5.1)
PROT SERPL-MCNC: 7.4 GM/DL (ref 6.4–8.2)
RED CELL DISTRIBUTION WIDTH CV: 12.5 % (ref 11.5–14.5)
RED CELL DISTRIBUTION WIDTH SD: 43.1 FL (ref 36.4–46.3)
SODIUM SERPL-SCNC: 140 MMOL/L (ref 136–145)
WBC # BLD AUTO: 11.47 K/UL (ref 4.8–10.8)

## 2018-04-30 NOTE — DIAGNOSTIC IMAGING REPORT
GALLBLADDER-ABD LIMITED



CLINICAL HISTORY: R flank pain; hx gallstones pain



TECHNIQUE: Ultrasound



COMPARISON STUDY:  None



FINDINGS: Gallbladder contains several stones in the region of the gallbladder

neck. The largest measures 1 cm. No pericholecystic fluid.



Common bile duct 4 mm. Liver shows mild fatty infiltration.



Pancreas is not identified. Right kidney demonstrates mild fullness of the renal

collecting system and renal pelvis. This may simply represent an extrarenal

pelvis. There is a 3 cm lower pole right renal cyst.



IMPRESSION:  

1. Several gallstones within the gallbladder neck.





2. Normal caliber bile ducts.

3. Mild fatty infiltration of liver. 



4. Mild fullness of the right renal collecting system which may be related to a

congenital extrarenal pelvis, although prior studies are not available for

comparison.. 









The above report was generated using voice recognition software.  It may contain

grammatical, syntax or spelling errors.







Electronically signed by:  Dante Barnett M.D.

4/30/2018 9:00 PM



Dictated Date/Time:  4/30/2018 8:57 PM

## 2018-04-30 NOTE — HISTORY AND PHYSICAL
History & Physical


Date & Time of Service:


Apr 30, 2018 at 23:53


Chief Complaint:


Gall Bladder


Primary Care Physician:


Jhonatan Tabor Jr,D.O.


History of Present Illness


Source:  patient, spouse


The patient is a 63-year-old male who presents to the emergency department with 

right sided flank and abdominal pain that initially occurred a couple days ago, 

then resolved spontaneously, and then recurred today.  The pain is severe, and 

he has had some occasional nausea but no vomiting.  He has been given morphine 

4 mg IV in the ED with little improvement in pain.  He does have a history of 

low back pain and had surgery last year, and did take 1 of his pain pills that 

was left over because he thought the pain may have been back related.  When he 

did not improve, he decided to come to the emergency department for assessment.





Past Medical/Surgical History


Medical Problems:


(1) Hydronephrosis of right kidney


(2) Lumbar stenosis with neurogenic claudication


(3) Right ureteral calculus








Family History


noncontributory





Social History


Smoking Status:  Current Some Day Smoker


Smokeless Tobacco Use:  No


Alcohol Use:  none


Drug Use:  none


Marital Status:  


Housing status:  lives with family





Immunizations


History of Influenza Vaccine:  No


History of Tetanus Vaccine?:  Unknown


History of Pneumococcal:  Unknown


History of Hepatitis B Vaccine:  Unknown





Allergies


Coded Allergies:  


     Mushroom Extract Complex (Verified  Allergy, Unknown, ANAPHYLAXIS, 8/30/17)


 PT STATES HE IS TOLD TO STAY AWAY FROM ALL MUSHROMS


     NO KNOWN DRUG ALLERGIES (Verified  Allergy, Unknown, NKDA, 8/30/17)





Home Medications


Scheduled


Aspirin Enteric Coated (Ecotrin Or Generic), 81 MG PO QAM


Cholecalciferol (Vitamin D3), 2,000 INTER.UNIT PO DAILY


Cyanocobalamin (Vitamin B12), 1,000 MCG PO QAM


Ferrous Sulfate (Ferrous Sulfate), 27 MG PO DAILY


Hctz/Losartan (Hyzaar 12.5MG/50MG), 1 TAB PO DAILY


Multivitamin (Multivitamin), 1 TAB PO QAM


Pravastatin Sod (Pravastatin Sodium), 10 MG PO HS


Sulindac (Clinoril), 200 MG PO BID


Zinc Gluconate (Zinc), 50 MG PO HS





Scheduled PRN


Amoxicillin (Amoxil), 500 MG PO UD PRN for Prior to Dental Procedures





Review of Systems


The patient denies chest pain, palpitations, shortness of breath, dyspnea on 

exertion, cough, lower extremity swelling, 


sore throat, fevers, chills, sweats, weight change, fatigue, vomiting, diarrhea 

, constipation, 


blood in urine or stool, dysuria, urinary frequency or urgency, lightheadedness

, dizziness, headache, memory loss, loss of consciousness, rash, abnormal 

bruising or bleeding,


imbalance, focal or generalized weakness, numbness or tingling in arms or legs, 

generalized arthralgias or myalgias, back or neck pain, or night sweats.


The review of systems is otherwise negative other than for that already noted 

above, and at least 10 systems have been reviewed.





Physical Exam


Vital Signs











  Date Time  Temp Pulse Resp B/P (MAP) Pulse Ox O2 Delivery O2 Flow Rate FiO2


 


4/30/18 23:01  77 16 147/92 94 Room Air  


 


4/30/18 21:07  63 16 145/94 93   


 


4/30/18 19:28 36.7 72 18 160/100 97 Room Air  








The patient is awake, alert and oriented 3, well developed and well nourished, 

normocephalic and atraumatic, lying in bed and in mild to moderate distress due 

to pain.


HEENT--PERRL, EOMI, mucous membranes and oropharynx dry.


Neck--supple.  No JVD. No bruits. Thyroid normal, trachea midline, no 

adenopathy.


Heart--normal S1 and S2.  No murmurs, rubs or gallops.


Lungs--clear bilaterally, no respiratory distress, no accessory muscle use.


Abdomen--normal bowel sounds and soft.  Mild tenderness right lower quadrant 

and right flank.  Nondistended, no hernias or masses,  no organomegaly.


Extremities--no cyanosis or clubbing. No edema on the right.  There is 1+ 

chronic pitting pretibial on the left.  There are good distal pulses b/l.


Dermatologic--normal skin turgor, normal color, no abnormal lymph nodes, no 

rash.


Neurologic--cranial nerves II through XII grossly intact.


Rheumatologic--normal range of motion.


Psychiatric--normal affect.





Diagnostics


Laboratory Results





Results Past 24 Hours








Test


  4/30/18


19:45 4/30/18


20:03 Range/Units


 


 


White Blood Count 11.47  4.8-10.8  K/uL


 


Red Blood Count 4.21  4.7-6.1  M/uL


 


Hemoglobin 13.6  14.0-18.0  g/dL


 


Hematocrit 39.6  42-52  %


 


Mean Corpuscular Volume 94.1    fL


 


Mean Corpuscular Hemoglobin 32.3  25-34  pg


 


Mean Corpuscular Hemoglobin


Concent 34.3


  


  32-36  g/dl


 


 


Platelet Count 189  130-400  K/uL


 


Mean Platelet Volume 11.1  7.4-10.4  fL


 


Neutrophils (%) (Auto) 76.1   %


 


Lymphocytes (%) (Auto) 14.7   %


 


Monocytes (%) (Auto) 6.9   %


 


Eosinophils (%) (Auto) 1.7   %


 


Basophils (%) (Auto) 0.3   %


 


Neutrophils # (Auto) 8.73  1.4-6.5  K/uL


 


Lymphocytes # (Auto) 1.69  1.2-3.4  K/uL


 


Monocytes # (Auto) 0.79  0.11-0.59  K/uL


 


Eosinophils # (Auto) 0.20  0-0.5  K/uL


 


Basophils # (Auto) 0.03  0-0.2  K/uL


 


RDW Standard Deviation 43.1  36.4-46.3  fL


 


RDW Coefficient of Variation 12.5  11.5-14.5  %


 


Immature Granulocyte % (Auto) 0.3   %


 


Immature Granulocyte # (Auto) 0.03  0.00-0.02  K/uL


 


Sodium Level 140  136-145  mmol/L


 


Potassium Level 3.6  3.5-5.1  mmol/L


 


Chloride Level 107    mmol/L


 


Carbon Dioxide Level 26  21-32  mmol/L


 


Anion Gap 7.0  3-11  mmol/L


 


Blood Urea Nitrogen 18  7-18  mg/dl


 


Creatinine


  1.67


  


  0.60-1.40


mg/dl


 


Est Creatinine Clear Calc


Drug Dose 56.4


  


   ml/min


 


 


Estimated GFR (


American) 49.7


  


   


 


 


Estimated GFR (Non-


American 42.9


  


   


 


 


BUN/Creatinine Ratio 11.0  10-20  


 


Random Glucose 107  70-99  mg/dl


 


Calcium Level 8.4  8.5-10.1  mg/dl


 


Total Bilirubin 0.5  0.2-1  mg/dl


 


Direct Bilirubin 0.1  0-0.2  mg/dl


 


Aspartate Amino Transf


(AST/SGOT) 22


  


  15-37  U/L


 


 


Alanine Aminotransferase


(ALT/SGPT) 28


  


  12-78  U/L


 


 


Alkaline Phosphatase 69    U/L


 


Total Protein 7.4  6.4-8.2  gm/dl


 


Albumin 3.5  3.4-5.0  gm/dl


 


Lipase 158    U/L


 


Urine Color  YELLOW  


 


Urine Appearance  CLEAR CLEAR  


 


Urine pH  5.0 4.5-7.5  


 


Urine Specific Gravity  1.029 1.000-1.030  


 


Urine Protein  NEG NEG  


 


Urine Glucose (UA)  NEG NEG  


 


Urine Ketones  NEG NEG  


 


Urine Occult Blood  NEG NEG  


 


Urine Nitrite  NEG NEG  


 


Urine Bilirubin  NEG NEG  


 


Urine Urobilinogen  NEG NEG  


 


Urine Leukocyte Esterase  NEG NEG  











Diagnostic Radiology





                                                                               

                                                                 


Patient Name: DANE CHEUNG                               Unit Number:  

[id number]                  


 








 











Dictated: 04/30/18 2057


 


Transcribed: 04/30/18 2057


 


MS


 


Printed Date/Time: [~ rep prt dt]/[~ rep prt tm]








 [~ rep ct labl] - [~ rep ct ivnm]


 





   [facility]


 Radiology Department


 [place] [address]


 [phone]





 











Dictated: 04/30/18 2057


 


Transcribed: 04/30/18 2057


 


MS


 


Printed Date/Time: [~ rep prt dt]/[~ rep prt tm]








 [~ rep ct labl] - [~ rep ct ivnm]


 








GALLBLADDER-ABD LIMITED





CLINICAL HISTORY: R flank pain; hx gallstones pain





TECHNIQUE: Ultrasound





COMPARISON STUDY:  None





FINDINGS: Gallbladder contains several stones in the region of the gallbladder


neck. The largest measures 1 cm. No pericholecystic fluid.





Common bile duct 4 mm. Liver shows mild fatty infiltration.





Pancreas is not identified. Right kidney demonstrates mild fullness of the renal


collecting system and renal pelvis. This may simply represent an extrarenal


pelvis. There is a 3 cm lower pole right renal cyst.





IMPRESSION:  


1. Several gallstones within the gallbladder neck.








2. Normal caliber bile ducts.


3. Mild fatty infiltration of liver. 





4. Mild fullness of the right renal collecting system which may be related to a


congenital extrarenal pelvis, although prior studies are not available for


comparison.. 














The above report was generated using voice recognition software.  It may contain


grammatical, syntax or spelling errors.











Electronically signed by:  Dante Barnett M.D.


4/30/2018 9:00 PM





Dictated Date/Time:  4/30/2018 8:57 PM





 The status of this report is Signed.   


 Draft = Not yet reviewed or approved by Radiologist.  


 Signed = Reviewed and approved by Radiologist.


<AttendingPhy></AttendingPhy> <FamilyPhy>Jhonatan Tabor Jr,D.O.</FamilyPhy> <

PrimaryPhy>Jhonatan Tabor Jr,D.O.</PrimaryPhy> <UnitNumber>[id number]</

UnitNumber> <VisitNumber>[id number]</VisitNumber> <PatientName>DANE CHEUNG</

PatientName> <DateOfBirth>1955</DateOfBirth> <Location>[facility]</Location> <

ServiceDate>04/30/18</ServiceDate> <MNE>ESINDI</MNE> <OrderingPhy>Juan Carlos Lundberg PA-C</OrderingPhy> <OrderingPhyMNE>f rep ord dr waller</OrderingPhyMNE> <

DictatingPhyMNE>f rep dict dr waller</DictatingPhyMNE> <CCListMNE>f rep ct sridhar</

CCListMNE> <AdmittingPhyMNE>f pt admit dr waller</AdmittingPhyMNE> <AttendingPhyMNE

>f pt attend dr waller</AttendingPhyMNE>


<ConsultingPhyMNE>f pt consult dr waller</ConsultingPhyMNE> <FamilyPhyMNE>f pt fam 

dr waller</FamilyPhyMNE> <OtherPhyMNE>f pt other dr waller</OtherPhyMNE> <

PrimaryPhyMNE>f pt prim care dr waller</PrimaryPhyMNE> <ReferringPhyMNE>f pt 

referring dr waller</ReferringPhyMNE>





Impression


Assessment and Plan


7 mm right proximal ureteral stone/right hydronephrosis--


Admit to medical surgical floor.


N.p.o. after midnight.


Ceftriaxone 1 g IV daily.


Follow urine culture and sensitivity report.


NSS at 80 ML's per hour.


Consult urology.





Acute kidney injury/hypertension--


Creatinine 1.67 upon admission.


Hydrate with normal saline as above.


Repeat BMP and magnesium serially. 


Hold HCTZ/losartan, aspirin and sulindac.





Hyperlipidemia--


hold pravastatin while n.p.o.





Vitamin B12 deficiency--


Hold cyanocobalamin 1000 mcg p.o. every morning until taking p.o.





Advanced Directives


Existing Advance Directive:  No


Existing Living Will:  No


Existing Power of :  No





Resuscitation Status








VTE Prophylaxis


Will order VTE Prophylaxis:  Yes





Social Service Consult


None Apply

## 2018-05-01 VITALS
DIASTOLIC BLOOD PRESSURE: 91 MMHG | SYSTOLIC BLOOD PRESSURE: 150 MMHG | HEART RATE: 63 BPM | TEMPERATURE: 97.7 F | OXYGEN SATURATION: 93 %

## 2018-05-01 VITALS
OXYGEN SATURATION: 94 % | HEART RATE: 58 BPM | TEMPERATURE: 98.06 F | DIASTOLIC BLOOD PRESSURE: 102 MMHG | SYSTOLIC BLOOD PRESSURE: 155 MMHG

## 2018-05-01 VITALS
TEMPERATURE: 98.42 F | SYSTOLIC BLOOD PRESSURE: 134 MMHG | OXYGEN SATURATION: 91 % | HEART RATE: 66 BPM | DIASTOLIC BLOOD PRESSURE: 82 MMHG

## 2018-05-01 VITALS
TEMPERATURE: 98.06 F | OXYGEN SATURATION: 94 % | HEART RATE: 64 BPM | SYSTOLIC BLOOD PRESSURE: 153 MMHG | DIASTOLIC BLOOD PRESSURE: 86 MMHG

## 2018-05-01 VITALS
DIASTOLIC BLOOD PRESSURE: 86 MMHG | SYSTOLIC BLOOD PRESSURE: 151 MMHG | OXYGEN SATURATION: 92 % | HEART RATE: 66 BPM | TEMPERATURE: 98.06 F

## 2018-05-01 VITALS
TEMPERATURE: 98.24 F | HEART RATE: 62 BPM | SYSTOLIC BLOOD PRESSURE: 165 MMHG | OXYGEN SATURATION: 92 % | DIASTOLIC BLOOD PRESSURE: 84 MMHG

## 2018-05-01 VITALS — DIASTOLIC BLOOD PRESSURE: 91 MMHG | HEART RATE: 68 BPM | SYSTOLIC BLOOD PRESSURE: 144 MMHG | TEMPERATURE: 98.24 F

## 2018-05-01 VITALS
SYSTOLIC BLOOD PRESSURE: 160 MMHG | HEART RATE: 59 BPM | TEMPERATURE: 98.6 F | OXYGEN SATURATION: 94 % | DIASTOLIC BLOOD PRESSURE: 88 MMHG

## 2018-05-01 LAB
BASOPHILS # BLD: 0.02 K/UL (ref 0–0.2)
BASOPHILS NFR BLD: 0.1 %
BUN SERPL-MCNC: 17 MG/DL (ref 7–18)
CALCIUM SERPL-MCNC: 8.1 MG/DL (ref 8.5–10.1)
CO2 SERPL-SCNC: 26 MMOL/L (ref 21–32)
CREAT SERPL-MCNC: 1.76 MG/DL (ref 0.6–1.4)
EOS ABS #: 0.04 K/UL (ref 0–0.5)
EOSINOPHIL NFR BLD AUTO: 188 K/UL (ref 130–400)
GLUCOSE SERPL-MCNC: 118 MG/DL (ref 70–99)
HCT VFR BLD CALC: 37.7 % (ref 42–52)
HGB BLD-MCNC: 12.8 G/DL (ref 14–18)
IG#: 0.04 K/UL (ref 0–0.02)
IMM GRANULOCYTES NFR BLD AUTO: 7.4 %
LYMPHOCYTES # BLD: 0.99 K/UL (ref 1.2–3.4)
MCH RBC QN AUTO: 32.2 PG (ref 25–34)
MCHC RBC AUTO-ENTMCNC: 34 G/DL (ref 32–36)
MCV RBC AUTO: 95 FL (ref 80–100)
MONO ABS #: 1.01 K/UL (ref 0.11–0.59)
MONOCYTES NFR BLD: 7.5 %
NEUT ABS #: 11.33 K/UL (ref 1.4–6.5)
NEUTROPHILS # BLD AUTO: 0.3 %
NEUTROPHILS NFR BLD AUTO: 84.4 %
PMV BLD AUTO: 11.3 FL (ref 7.4–10.4)
POTASSIUM SERPL-SCNC: 3.9 MMOL/L (ref 3.5–5.1)
RED CELL DISTRIBUTION WIDTH CV: 12.6 % (ref 11.5–14.5)
RED CELL DISTRIBUTION WIDTH SD: 43.5 FL (ref 36.4–46.3)
SODIUM SERPL-SCNC: 140 MMOL/L (ref 136–145)
WBC # BLD AUTO: 13.43 K/UL (ref 4.8–10.8)

## 2018-05-01 PROCEDURE — 0T768DZ DILATION OF RIGHT URETER WITH INTRALUMINAL DEVICE, VIA NATURAL OR ARTIFICIAL OPENING ENDOSCOPIC: ICD-10-PCS | Performed by: UROLOGY

## 2018-05-01 RX ADMIN — SODIUM CHLORIDE SCH MLS/HR: 900 INJECTION, SOLUTION INTRAVENOUS at 13:10

## 2018-05-01 RX ADMIN — HYDROMORPHONE HYDROCHLORIDE PRN MG: 2 INJECTION, SOLUTION INTRAMUSCULAR; INTRAVENOUS; SUBCUTANEOUS at 09:08

## 2018-05-01 RX ADMIN — SODIUM CHLORIDE SCH MLS/HR: 900 INJECTION, SOLUTION INTRAVENOUS at 01:01

## 2018-05-01 RX ADMIN — CEFTRIAXONE SODIUM SCH MLS/HR: 1 INJECTION, POWDER, FOR SOLUTION INTRAVENOUS at 01:01

## 2018-05-01 RX ADMIN — HYDROMORPHONE HYDROCHLORIDE PRN MG: 2 INJECTION, SOLUTION INTRAMUSCULAR; INTRAVENOUS; SUBCUTANEOUS at 05:54

## 2018-05-01 RX ADMIN — PANTOPRAZOLE SODIUM SCH MLS/MIN: 40 INJECTION, POWDER, FOR SOLUTION INTRAVENOUS at 10:59

## 2018-05-01 NOTE — EMERGENCY ROOM VISIT NOTE
ED Visit Note


First contact with patient:  19:30


Chief Complaint: Abdominal pain





History of Present Illness: Bebeto  is a year-old white female complaining of  

quadrant abdominal pain.


Historically patient reports


Patient reports a onset of quadrant abdominal pain that started approximately 

hours ago.  Since that time the pain has been .  The pain is currently 

described as .  The pain is nonradiating.  The pain worsens with and is 

improved by .  has been taken for pain and relief has been achieved.  

Associated with the pain there has been .


Patient denies fevers, chills, sweats, skin eruptions, skin color changes, 

upper respiratory tract symptoms, shortness of breath, chest pain, nausea, 

vomiting, diarrhea, constipation, rectal bleeding, black/tarry stools, urinary 

symptoms, hematuria, vaginal bleeding, vaginal discharge, back/flank pain.





Review of Systems: As noted above in history of present illness. All body 

systems were reviewed and found to be negative as noted above.





Past Medical History: As previously noted and hypertension, nasal AVM, 

prostrate cancer, status post carpal tunnel release, lumbar back surgery.


Current Medications:


Allergies to Medications: Patient denies.


Social History: Patient is currently employed; he feels safe in his home 

environment; he admits to tobacco use and denies alcohol use.





Physical Examination:


Vital Signs: 








  Date Time  Temp Pulse Resp B/P (MAP) Pulse Ox O2 Delivery O2 Flow Rate FiO2


 


4/30/18 23:01  77 16 147/92 94 Room Air  


 


4/30/18 21:07  63 16 145/94 93   


 


4/30/18 19:28 36.7 72 18 160/100 97 Room Air  








GENERAL: -year-old female in mild to moderate distress due to pain, nontoxic-

appearing, afebrile and hemodynamically stable.


NEUROLOGICAL: Awake, alert and oriented to person, place and time.  Answering 

questions appropriately and following commands.  Normal gait.  Good hand eye 

coordination.  


SKIN: Warm, dry and pink.  No soft tissue eruptions or trauma noted.


HEENT:  Atraumatic and normocephalic.  PERRL.  Sclera white and conjunctiva 

pink.  Oral cavity moist and pink.  Pharynx is nonerythematous or edematous.  

Speech normal.  No lymphadenopathy.  Trachea midline.  No jugular venous 

distention.


BACK:  No tenderness over the bony spine.  No CVA tenderness.  


THORAX:  Lungs sounds are clear to auscultation and equal bilaterally with 

symmetrical chest wall.  No wheezing, rales or rhonchi.  No crepitus, tenderness

, subcutaneous air or deformities noted.


HEART:  Regular rate and rhythm.  No gallops, rubs or murmurs are appreciated.


ABDOMEN: Flat, soft and nontender.  Positive bowel sounds in all quadrants.  No 

guarding, rigidity or organomegaly.


EXTREMITIES:  Moves all extremities well on command and with purpose.  All 

distal neurovascular statuses are intact and equal bilaterally.  





ED Course:


Patient is assessed as noted above.


Patient's medication list was reviewed.


Laboratory Testing:








Test


  4/30/18


19:45 4/30/18


20:03 Range/Units


 


 


White Blood Count 11.47  4.8-10.8  K/uL


 


Red Blood Count 4.21  4.7-6.1  M/uL


 


Hemoglobin 13.6  14.0-18.0  g/dL


 


Hematocrit 39.6  42-52  %


 


Mean Corpuscular Volume 94.1    fL


 


Mean Corpuscular Hemoglobin 32.3  25-34  pg


 


Mean Corpuscular Hemoglobin


Concent 34.3


  


  32-36  g/dl


 


 


Platelet Count 189  130-400  K/uL


 


Mean Platelet Volume 11.1  7.4-10.4  fL


 


Neutrophils (%) (Auto) 76.1   %


 


Lymphocytes (%) (Auto) 14.7   %


 


Monocytes (%) (Auto) 6.9   %


 


Eosinophils (%) (Auto) 1.7   %


 


Basophils (%) (Auto) 0.3   %


 


Neutrophils # (Auto) 8.73  1.4-6.5  K/uL


 


Lymphocytes # (Auto) 1.69  1.2-3.4  K/uL


 


Monocytes # (Auto) 0.79  0.11-0.59  K/uL


 


Eosinophils # (Auto) 0.20  0-0.5  K/uL


 


Basophils # (Auto) 0.03  0-0.2  K/uL


 


RDW Standard Deviation 43.1  36.4-46.3  fL


 


RDW Coefficient of Variation 12.5  11.5-14.5  %


 


Immature Granulocyte % (Auto) 0.3   %


 


Immature Granulocyte # (Auto) 0.03  0.00-0.02  K/uL


 


Sodium Level 140  136-145  mmol/L


 


Potassium Level 3.6  3.5-5.1  mmol/L


 


Chloride Level 107    mmol/L


 


Carbon Dioxide Level 26  21-32  mmol/L


 


Anion Gap 7.0  3-11  mmol/L


 


Blood Urea Nitrogen 18  7-18  mg/dl


 


Creatinine


  1.67


  


  0.60-1.40


mg/dl


 


Est Creatinine Clear Calc


Drug Dose 56.4


  


   ml/min


 


 


Estimated GFR (


American) 49.7


  


   


 


 


Estimated GFR (Non-


American 42.9


  


   


 


 


BUN/Creatinine Ratio 11.0  10-20  


 


Random Glucose 107  70-99  mg/dl


 


Calcium Level 8.4  8.5-10.1  mg/dl


 


Total Bilirubin 0.5  0.2-1  mg/dl


 


Direct Bilirubin 0.1  0-0.2  mg/dl


 


Aspartate Amino Transf


(AST/SGOT) 22


  


  15-37  U/L


 


 


Alanine Aminotransferase


(ALT/SGPT) 28


  


  12-78  U/L


 


 


Alkaline Phosphatase 69    U/L


 


Total Protein 7.4  6.4-8.2  gm/dl


 


Albumin 3.5  3.4-5.0  gm/dl


 


Lipase 158    U/L


 


Urine Color  YELLOW  


 


Urine Appearance  CLEAR CLEAR  


 


Urine pH  5.0 4.5-7.5  


 


Urine Specific Gravity  1.029 1.000-1.030  


 


Urine Protein  NEG NEG  


 


Urine Glucose (UA)  NEG NEG  


 


Urine Ketones  NEG NEG  


 


Urine Occult Blood  NEG NEG  


 


Urine Nitrite  NEG NEG  


 


Urine Bilirubin  NEG NEG  


 


Urine Urobilinogen  NEG NEG  


 


Urine Leukocyte Esterase  NEG NEG  





Gallbladder Ultrasound: Was reviewed by myself and read by the radiologist and 

shows several stones in the region of the gallbladder neck with the largest 

measuring 1 cm.  No holistic fluid.  Common bile duct measuring 4 mm.  Liver 

showing fatty infiltration.  Pancreas was not identified.  Right kidney 

demonstrate mild fullness of the renal collection system and renal pelvis with 

a 3 cm lower pole renal cyst


Noncontrast Abdominal/Pelvic CT: Was reviewed by myself and read by the 

radiologist and showing a 7 mm stone in the proximal right ureter with moderate 

proximal dilatation, left nonobstructing renal stone, cholelithiasis and a 

normal-appearing appendix.


Patient was hydrated with normal saline and he received a total of 8 mg of 

morphine IV for pain and 4 mg of Zofran IV.


Patient was reassessed multiple times during his stay in the emergency 

department.


Patient's case was consulted with general surgery who felt a noncontrast CT of 

the abdomen and pelvis was necessary to evaluate for ureter calculus; they felt 

that there was no ureter calculus patient should be admitted by general surgery 

for cholecystectomy tomorrow.


Patient's case was reviewed with Dr. Padilla; we agreed on diagnostic approach, 

treatment, disposition and plan.


Patient's case was consulted with case management and Dr. Olmos, 

hospitalist, for medical observation/admission.


Patient was educated about today's findings and instructed on his treatment plan

; he verbalized understanding and agreement with this plan.





Clinical Impression: Right ureter calculus.  Cholelithiasis.





Decision-Making: Initially my differential diagnosis I considered urinary 

calculus, cholelithiasis, pancreatitis, hepatitis, pyelonephritis and other 

causes.





Disposition and Plan: Patient to be brought in the hospital by Dr. Olmos; 

please see his notes and orders for final disposition and plan.

## 2018-05-01 NOTE — DIAGNOSTIC IMAGING REPORT
CHEST 2 VIEWS ROUTINE



CLINICAL HISTORY: Preoperative evaluation.    



COMPARISON STUDY:  Chest radiograph August 9, 2017.



FINDINGS: Several endovascular coils are noted within the left lower lobe. No

pneumothorax or pleural effusion is noted. There is no evidence for pulmonary

edema. Cardiomediastinal silhouette is within normal limits. 



IMPRESSION:  No acute cardiopulmonary findings. 









Electronically signed by:  Blas Bloom M.D.

5/1/2018 8:36 AM



Dictated Date/Time:  5/1/2018 8:35 AM

## 2018-05-01 NOTE — DIAGNOSTIC IMAGING REPORT
[facility]



CLINICAL HISTORY: Right ureteral stone.    



COMPARISON STUDY:  CT of the abdomen and pelvis April 30, 2018. 



FINDINGS: An 8 mm proximal right ureteral calculus is unchanged in position

since CT performed April 30, 2018. Pelvic calcifications represent phleboliths.

There are postoperative findings within the spine. Gallstones are noted. Left

lower lobe endovascular coils are noted.



IMPRESSION:  No change in position of an 8 mm proximal right ureteral calculus. 







Electronically signed by:  Blas Bloom M.D.

5/1/2018 8:39 AM



Dictated Date/Time:  5/1/2018 8:37 AM

## 2018-05-01 NOTE — [facility] OPERATIVE REPORT
Operative Report


Operative Date


May 1, 2018.





Pre-Operative Diagnosis





Right ureteral stone





Post-Operative Diagnosis





Right ureteral stone





Procedure(s) Performed





Cystoscopy, Right Ureteral Stent Insertion (6 Ukrainian by 22-32cm)





Surgeon


Dr. Alvarado





Estimated Blood Loss


0 cc





Findings


Some purulent urine in the right kidney





Specimens





none per surgeon





Drains


Ureteral stent





Anesthesia Type


MAC





Complication(s)


none





Disposition


yes


Recovery Room / PACU





Indications


Renal colic





Description of Procedure


The patient was identified in the preoperative holding area, appropriate 

informed consents reviewed and completed he was transported to the operating 

suite.  Upon arrival he received appropriate preoperative antibiotics in the 

form of ciprofloxacin.  Adequate sedation was achieved and he was placed in 

dorsal lithotomy position where he was sterilely prepped and draped in standard 

fashion.  I began the case by passing a 22 Ukrainian cystoscope with 30 lens.  

Inspection of the urethra revealed no abnormalities.  His prostate was 

relatively small in size.  Section of the bladder revealed healthy appearing 

bladder mucosa.  Ureteral orifices were in orthotopic position.  I cannulated 

the right ureteral orifice with a 5 Ukrainian open-ended catheter and a sensor 

wire.  Wire advanced the kidney without difficulty.  I cannot readily identify 

the ureteral fragment seen on CT on the fluoroscope within the OR.  I 

subsequently placed a 6 Ukrainian by 2232 cm double-J ureteral stent seeing a good 

curl in the kidney as well as the bladder.  I then decompressed the bladder and 

concluded the case.  He was reversed from anesthesia and taken to the recovery 

room in stable condition.


I attest to the content of the Intraoperative Record and any orders documented 

therein.  Any exceptions are noted below.

## 2018-05-01 NOTE — DIAGNOSTIC IMAGING REPORT
[facility]



HISTORY:  63 years-old Male RT CYSTO right-sided cystourethrogram with stent

placement



COMPARISON: [facility] 5/1/2017, CT 4/30/2018.



TECHNIQUE: Single spot fluoroscopic image of the right upper abdomen was

obtained utilizing 6.2 seconds fluoroscopy time



FINDINGS: 

Single image demonstrates the proximal portion of a right ureteral stent which

has been placed in the interval. Previously described ureteral calculus is not

seen on this image. Cholelithiasis. Degenerative changes of the imaged spine are

noted.



IMPRESSION: Fluoroscopic assistance as above. Please see procedural report for

further details. 







The above report was generated using voice recognition software. It may contain

grammatical, syntax or spelling errors.







Electronically signed by:  Jim Hernández M.D.

5/1/2018 4:51 PM



Dictated Date/Time:  5/1/2018 4:50 PM

## 2018-05-01 NOTE — HOSPITALIST PROGRESS NOTE
Hospitalist Progress Note


Date of Service


May 1, 2018.


 (Franny Helms PA-C)





Subjective


Pt evaluation today including:  conversation w/ patient, physical exam, chart 

review, lab review, review of studies


Pain:  None


PO Intake:  NPO


Voiding:  no voiding problems


The patient was seen and examined this afternoon. Pt reports feeling ok right 

now, his pain is adequately controlled, and was sleeping when I walked into the 

room. He reports never having a kidney stone before.  He typically does drink a 

12oz glass of icetea and 8oz of coffee 3x per week, however no excessive 

caffeine use.  He is awaiting stent placement later this afternoon.  Pt had 

voided about 1 hr ago, denies hematuria.





   Constitutional:  No fever, No chills, No sweats, No fatigue


   Eyes:  No redness, No diplopia


   ENT:  No nasal symptoms, No sore throat


   Respiratory:  + problem reported (hx smoking x 28 yrs, 5-6 cigs a day), No 

cough, No sputum, No shortness of breath


   Cardiovascular:  No chest pain, No edema


   Abdomen:  No pain, No nausea, No vomiting, No diarrhea, No constipation


   Musculoskeletal:  + swelling, No muscle pain


   Male :  + see HPI


   Neurologic:  No weakness, No numbness/tingling (Franny Helms PA-C

)





Objective


Vital Signs











  Date Time  Temp Pulse Resp B/P (MAP) Pulse Ox O2 Delivery O2 Flow Rate FiO2


 


5/1/18 07:35      Room Air  


 


5/1/18 07:19 36.9 66 18 134/82 (99) 91 Room Air  


 


5/1/18 00:43 36.8 68 18 144/91  Room Air  


 


5/1/18 00:35      Room Air  


 


5/1/18 00:13  71 16 145/89 92 Room Air  


 


4/30/18 23:01  77 16 147/92 94 Room Air  


 


4/30/18 21:07  63 16 145/94 93   


 


4/30/18 19:28 36.7 72 18 160/100 97 Room Air  








 (Franny Helms PA-C)





Physical Exam


General Appearance:  WD/WN, no apparent distress


Eyes:  PERRL, EOMI


ENT:  hearing grossly normal, pharynx normal


Neck:  no adenopathy, no JVD


Respiratory/Chest:  lungs clear, no respiratory distress, no accessory muscle 

use, + pertinent finding (on RA)


Cardiovascular:  regular rate, rhythm, no murmur


Abdomen:  normal bowel sounds, non tender, soft


Extremities:  non-tender, no calf tenderness, + pedal edema (1+ pitting edema 

in the LLE, trace pitting in the RLE)


Neurologic/Psychiatric:  alert, normal mood/affect, oriented x 3


Skin:  normal color, warm/dry


 (Franny Helms, CHRISTI)





Laboratory Results





Last 24 Hours








Test


  4/30/18


19:45 4/30/18


20:03 5/1/18


08:55


 


White Blood Count 11.47 K/uL   13.43 K/uL 


 


Red Blood Count 4.21 M/uL   3.97 M/uL 


 


Hemoglobin 13.6 g/dL   12.8 g/dL 


 


Hematocrit 39.6 %   37.7 % 


 


Mean Corpuscular Volume 94.1 fL   95.0 fL 


 


Mean Corpuscular Hemoglobin 32.3 pg   32.2 pg 


 


Mean Corpuscular Hemoglobin


Concent 34.3 g/dl 


  


  34.0 g/dl 


 


 


Platelet Count 189 K/uL   188 K/uL 


 


Mean Platelet Volume 11.1 fL   11.3 fL 


 


Neutrophils (%) (Auto) 76.1 %   84.4 % 


 


Lymphocytes (%) (Auto) 14.7 %   7.4 % 


 


Monocytes (%) (Auto) 6.9 %   7.5 % 


 


Eosinophils (%) (Auto) 1.7 %   0.3 % 


 


Basophils (%) (Auto) 0.3 %   0.1 % 


 


Neutrophils # (Auto) 8.73 K/uL   11.33 K/uL 


 


Lymphocytes # (Auto) 1.69 K/uL   0.99 K/uL 


 


Monocytes # (Auto) 0.79 K/uL   1.01 K/uL 


 


Eosinophils # (Auto) 0.20 K/uL   0.04 K/uL 


 


Basophils # (Auto) 0.03 K/uL   0.02 K/uL 


 


RDW Standard Deviation 43.1 fL   43.5 fL 


 


RDW Coefficient of Variation 12.5 %   12.6 % 


 


Immature Granulocyte % (Auto) 0.3 %   0.3 % 


 


Immature Granulocyte # (Auto) 0.03 K/uL   0.04 K/uL 


 


Sodium Level 140 mmol/L   140 mmol/L 


 


Potassium Level 3.6 mmol/L   3.9 mmol/L 


 


Chloride Level 107 mmol/L   108 mmol/L 


 


Carbon Dioxide Level 26 mmol/L   26 mmol/L 


 


Anion Gap 7.0 mmol/L   6.0 mmol/L 


 


Blood Urea Nitrogen 18 mg/dl   17 mg/dl 


 


Creatinine 1.67 mg/dl   1.76 mg/dl 


 


Est Creatinine Clear Calc


Drug Dose 56.4 ml/min 


  


  53.3 ml/min 


 


 


Estimated GFR (


American) 49.7 


  


  46.7 


 


 


Estimated GFR (Non-


American 42.9 


  


  40.3 


 


 


BUN/Creatinine Ratio 11.0   9.7 


 


Random Glucose 107 mg/dl   118 mg/dl 


 


Calcium Level 8.4 mg/dl   8.1 mg/dl 


 


Total Bilirubin 0.5 mg/dl   


 


Direct Bilirubin 0.1 mg/dl   


 


Aspartate Amino Transf


(AST/SGOT) 22 U/L 


  


  


 


 


Alanine Aminotransferase


(ALT/SGPT) 28 U/L 


  


  


 


 


Alkaline Phosphatase 69 U/L   


 


Total Protein 7.4 gm/dl   


 


Albumin 3.5 gm/dl   


 


Lipase 158 U/L   


 


Urine Color  YELLOW  


 


Urine Appearance  CLEAR  


 


Urine pH  5.0  


 


Urine Specific Gravity  1.029  


 


Urine Protein  NEG  


 


Urine Glucose (UA)  NEG  


 


Urine Ketones  NEG  


 


Urine Occult Blood  NEG  


 


Urine Nitrite  NEG  


 


Urine Bilirubin  NEG  


 


Urine Urobilinogen  NEG  


 


Urine Leukocyte Esterase  NEG  








 (Franny Helms, CHRISTI)





Assessment and Plan


62 yo M with 7 mm right proximal ureteral stone/right hydronephrosis--





R proximal ureteral stone


R hydronephrosis


- CT abd showing 8x6mm stone 


- Ceftriaxone 1 g IV daily (started 4/30)


- Follow urine culture and sensitivity report.


- NSS at 80 ml/hr 


- Consult urology - planned for stent placement today, continue NPO until after 

procedure





Acute kidney injury/hypertension--


Creatinine 1.67 upon admission and still elevated at 1.76 today.


- IVF as above


- Follow PRP and magnesium serially. 


- Hold HCTZ/losartan, aspirin and sulindac.





HTN


Hx Stroke with hx left sided weakness now resolved


Pulmonary Atrioventricular fistula


- BP slightly elevated in 140s/90s this morning likely due to pain and holding 

antihypertensives as above d/t EDWARD. 


- Follow.  





Hyperlipidemia--


- hold pravastatin while n.p.o.





Chronic tobacco use


- smokes cigarettes x 28 years at this point, denies willingness to quit 

currently, offered nicotine patch however he declines at this time. 





Vitamin B12 deficiency--


- Hold cyanocobalamin 1000 mcg p.o. every morning until taking p.o.





Hx prostate cancer- stable





Chronic lower back pain


- stable





DVT ppx: teds, scds, ambulatory





CODE: FULL 





Disposition: From home. Possible dc tomorrow per urology


 (Franny Helms, CHRISTI)





Reviewed:  Pt Seen/Exam by Me


 (Cleina Back MD)


History


Physician Assistant supervision Note:





I interviewed and examined the patient. Discussed with NESSA Helms and agree 

with findings and plan as documented in the note. Any exceptions or 

clarifications are listed here:





Patient feeling much improved since admission and after placement of stent in 

his right ureter.  Denies any further right flank or abdominal pain.  He is 

tolerating p.o. without nausea or vomiting.  Denies chest pain shortness of 

breath.





Vitals reviewed


Gen: AAOx3, NAD


HEENT: anicteric sclerae, EOMI


CV: RRR no mgr nl S1S2


Pulm: CTAB no wcr


Abd: +BS soft NT ND no masses or hernias


Ext: no edema, 2+ DP pulses


Skin: no rashes, warm/dry





63-year-old male with history as above, here with right ureterolithiasis and 

acute kidney injury.


-Continue IV fluids and follow creatinine


-For elevated blood pressure-his home HCTZ/losartan is on hold for EDWARD-will 

order IV hydralazine 10 mg q. 8 as needed SBP greater than 180 or DBP greater 

than 110, consider adding on amlodipine tomorrow if to be discharged and 

creatinine still not back to normal


-Urinalysis looked normal, urine culture pending, but can likely discontinue 

Rocephin tomorrow


-Will likely discharge to home tomorrow if creatinine improved and will need 

follow-up with urology for planned definitive stone treatment


-Has noted asymptomatic cholelithiasis on imaging-should consider elective 

cholecystectomy with general surgery as an outpatient after recovery from 

kidney stone





Documented By:  Celina Back


 (Celina Back MD)

## 2018-05-01 NOTE — ANESTHESIOLOGY PROGRESS NOTE
Anesthesia Post Op Note


Date & Time


May 1, 2018 at 16:36





Vital Signs


Pain Intensity:  5.0





Vital Signs Past 12 Hours








  Date Time  Temp Pulse Resp B/P (MAP) Pulse Ox O2 Delivery O2 Flow Rate FiO2


 


5/1/18 15:17 37.1 65 16 146/92 (110) 95 Room Air  


 


5/1/18 07:35      Room Air  


 


5/1/18 07:19 36.9 66 18 134/82 (99) 91 Room Air  











Notes


Mental Status:  alert / awake / arousable, participated in evaluation


Pt Amnestic to Procedure:  Yes


Nausea / Vomiting:  adequately controlled


Pain:  adequately controlled


Airway Patency, RR, SpO2:  stable & adequate


BP & HR:  stable & adequate


Hydration State:  stable & adequate


Anesthetic Complications:  no major complications apparent

## 2018-05-01 NOTE — UROLOGY CONSULTATION
History


General


Date of Service:


May 1, 2018.


Chief Complaint:  right flank pain


Primary Care Physician:


Jhonatan Tabor Jr,D.O.


Pt seen a urologist before?:  No





History of Present Illness


64 yo male admitted to [facility] with c/o right flank pain that started 5 days ago 

while mowing the grass. 


CT scan showing a 8x6mm proximal right ureteral stone. 


The pt has no previous hx of stones. 


He c/o right flank pain 5/10 this morning. 


Denies n/v. 


Denies dysuria or hematuria. 


He is currently afebrile. 


White count is 11.47. 


Cr noted to be 1.67. Baseline of 1.00.





Imaging


Imaging:  CT





Laboratory





Last 24 Hours








Test


  4/30/18


19:45 4/30/18


20:03


 


White Blood Count 11.47 K/uL  


 


Red Blood Count 4.21 M/uL  


 


Hemoglobin 13.6 g/dL  


 


Hematocrit 39.6 %  


 


Mean Corpuscular Volume 94.1 fL  


 


Mean Corpuscular Hemoglobin 32.3 pg  


 


Mean Corpuscular Hemoglobin


Concent 34.3 g/dl 


  


 


 


Platelet Count 189 K/uL  


 


Mean Platelet Volume 11.1 fL  


 


Neutrophils (%) (Auto) 76.1 %  


 


Lymphocytes (%) (Auto) 14.7 %  


 


Monocytes (%) (Auto) 6.9 %  


 


Eosinophils (%) (Auto) 1.7 %  


 


Basophils (%) (Auto) 0.3 %  


 


Neutrophils # (Auto) 8.73 K/uL  


 


Lymphocytes # (Auto) 1.69 K/uL  


 


Monocytes # (Auto) 0.79 K/uL  


 


Eosinophils # (Auto) 0.20 K/uL  


 


Basophils # (Auto) 0.03 K/uL  


 


RDW Standard Deviation 43.1 fL  


 


RDW Coefficient of Variation 12.5 %  


 


Immature Granulocyte % (Auto) 0.3 %  


 


Immature Granulocyte # (Auto) 0.03 K/uL  


 


Sodium Level 140 mmol/L  


 


Potassium Level 3.6 mmol/L  


 


Chloride Level 107 mmol/L  


 


Carbon Dioxide Level 26 mmol/L  


 


Anion Gap 7.0 mmol/L  


 


Blood Urea Nitrogen 18 mg/dl  


 


Creatinine 1.67 mg/dl  


 


Est Creatinine Clear Calc


Drug Dose 56.4 ml/min 


  


 


 


Estimated GFR (


American) 49.7 


  


 


 


Estimated GFR (Non-


American 42.9 


  


 


 


BUN/Creatinine Ratio 11.0  


 


Random Glucose 107 mg/dl  


 


Calcium Level 8.4 mg/dl  


 


Total Bilirubin 0.5 mg/dl  


 


Direct Bilirubin 0.1 mg/dl  


 


Aspartate Amino Transf


(AST/SGOT) 22 U/L 


  


 


 


Alanine Aminotransferase


(ALT/SGPT) 28 U/L 


  


 


 


Alkaline Phosphatase 69 U/L  


 


Total Protein 7.4 gm/dl  


 


Albumin 3.5 gm/dl  


 


Lipase 158 U/L  


 


Urine Color  YELLOW 


 


Urine Appearance  CLEAR 


 


Urine pH  5.0 


 


Urine Specific Gravity  1.029 


 


Urine Protein  NEG 


 


Urine Glucose (UA)  NEG 


 


Urine Ketones  NEG 


 


Urine Occult Blood  NEG 


 


Urine Nitrite  NEG 


 


Urine Bilirubin  NEG 


 


Urine Urobilinogen  NEG 


 


Urine Leukocyte Esterase  NEG 











Past History


other (lumbar stenosis)


Past Surgical History:  orthopedic surgery (wrist surgery), spinal surgery





Family History


non-contributory





Social History


Hx Tobacco Use In Past Year?:  Yes


Smoking:  other (current someday smoker)


Alcohol:  never


Drug use:  none


Marital status:  


Housing status:  lives with family





Immunizations


History of Influenza Vaccine:  No


History of Tetanus Vaccine?:  Unknown


History of Pneumococcal:  Unknown


History of Hepatitis B Vaccine:  Unknown





History of MDRO


No





Allergies


Coded Allergies:  


     Mushroom Extract Complex (Verified  Allergy, Unknown, ANAPHYLAXIS, 8/30/17)


 [facility]


     NO KNOWN DRUG ALLERGIES (Verified  Allergy, Unknown, NKDA, 8/30/17)





Medications


Home Medications:





Home Meds and Scripts








 Medications  Dose


 Route/Sig


 Max Daily Dose Days Date Category


 


 Clinoril


  (Sulindac) 200 Mg


 Tab  200 Mg


 PO BID


    4/30/18 Reported


 


 Vitamin D3


  (Cholecalciferol)


 2,000 Unit Cap  2,000 Inter.unit


 PO DAILY


    4/30/18 Reported


 


 Hyzaar


 12.5MG/50MG


  (HCTZ/Losartan


 Potassium)  Tab  1 Tab


 PO DAILY


    4/30/18 Reported


 


 Ferrous Sulfate


 27 Mg Tab  27 Mg


 PO DAILY


    4/30/18 Reported


 


 Zinc (Zinc


 Gluconate) 50 Mg


 Tab  50 Mg


 PO HS


    4/30/18 Reported


 


 Pravastatin


 Sodium


  (Pravastatin Sod)


 10 Mg Tab  10 Mg


 PO HS


    4/30/18 Reported


 


 Vitamin B12


  (Cyanocobalamin)


 1,000 Mcg Tab  1,000 Mcg


 PO QAM


    8/9/17 Reported


 


 Amoxil


  (Amoxicillin) 500


 Mg Cap  500 Mg


 PO UD PRN


    8/9/17 Reported


 


 Ecotrin Or


 Generic (Aspirin)


 81 Mg Tab  81 Mg


 PO QAM


    4/1/12 Reported


 


 Multivitamin


  (Multivitamins)


 Tab  1 Tab


 PO QAM


    7/6/09 Reported








Inpatient Medications:





Current Inpatient Medications








 Medications


  (Trade)  Dose


 Ordered  Sig/Rod


 Route  Start Time


 Stop Time Status Last Admin


Dose Admin


 


 Ondansetron HCl


  (Zofran Inj)  4 mg  Q6H  PRN


 IV  5/1/18 00:15


 5/31/18 00:14   


 


 


 Hydromorphone HCl


  (Dilaudid Inj)  1 mg  Q2H  PRN


 IV  5/1/18 00:15


 5/15/18 00:14  5/1/18 05:54


1 MG


 


 Acetaminophen  100 ml @ 


 400 mls/hr  Q8H  PRN


 IV  5/1/18 00:15


 5/31/18 00:14   


 


 


 Acetaminophen


  (Tylenol Tab)  650 mg  Q4H  PRN


 PO  5/1/18 00:15


 5/31/18 00:14   


 


 


 Ceftriaxone


 Sodium 1 gm/


 Dextrose  50 ml @ 


 100 mls/hr  Q24H


 IV  5/1/18 01:00


 5/11/18 00:59  5/1/18 01:01


100 MLS/HR


 


 Pantoprazole


 Sodium 40 mg/


 Syringe  10 ml @ 5


 mls/min  DAILY@11


 IV  5/1/18 11:00


 5/31/18 10:59   


 


 


 Sodium Chloride  1,000 ml @ 


 80 mls/hr  [id number]


 IV  5/1/18 01:30


 5/31/18 01:29  5/1/18 01:01


80 MLS/HR











Review of Systems


Review of Systems


Constitutional:  No fever, No chills


Eyes:  No double vision


Neurological:  No dizzy


Endocrine:  No excessive thirst


Gastrointestinal:  + abdominal pain (right flank pain 5/10 ), No nausea, No 

vomiting


Cardiovascular:  No chest pain


Respiratory:  No shortness of breath


Skin:  No rash


Musculoskeletal:  No back pain


Male :  No painful urination, No blood in urine





Physical Exam


Vital Signs:





Vital Signs Past 12 Hours








  Date Time  Temp Pulse Resp B/P (MAP) Pulse Ox O2 Delivery O2 Flow Rate FiO2


 


5/1/18 07:19 36.9 66 18 134/82 (99) 91 Room Air  


 


5/1/18 00:43 36.8 68 18 144/91  Room Air  


 


5/1/18 00:35      Room Air  


 


5/1/18 00:13  71 16 145/89 92 Room Air  


 


4/30/18 23:01  77 16 147/92 94 Room Air  


 


4/30/18 21:07  63 16 145/94 93   








Physical Exam:


General Appearance:  no apparent distress


Eyes:  bilateral eyes normal inspection


ENT:  hearing grossly normal


Neck:  no JVD


Respiratory/Chest:  no respiratory distress, no accessory muscle use


Cardiovascular:  no JVD


Extremities:  normal inspection


Neurologic/Psychiatric:  alert, normal mood/affect, oriented x 3


Skin:  normal color





Assessment & Plan


Assessment & Plan


Treatment Planned:  cystoscopy w/ stent





A/P: 8 x 6mm proximal right ureteral stone





AFVSS. 


Tx options discussed with the pt today have included a trial of passage with 

MET vs cysto with right ureteral stent placement. The pt prefers cysto with 

right ureteral stent placement today. Risks and benefits of the procedure 

discussed with the pt. All questions answered. Pt agrees to the procedure at 

this time. 


Will order a pre-op chest x-ray and EKG. 


Will check a KUB for stone visibility as well. 


He is currently NPO. 


Will order Cipro pre-op. 


Thanks for the consult. Will continue to follow along with primary service. 

Thanks for allowing us to participate in this pt's care.

## 2018-05-01 NOTE — DIAGNOSTIC IMAGING REPORT
CT OF THE ABDOMEN AND PELVIS WITHOUT CONTRAST, STONE PROTOCOL



CLINICAL HISTORY: Right flank/abdominal pain.    



COMPARISON STUDY:  CT of the chest and abdomen July 6, 2009 and right upper

quadrant ultrasound April 30, 2018.



TECHNIQUE: Helical axial images of the abdomen and pelvis were obtained without

IV or oral contrast according to renal stone protocol.  A dose lowering

technique was utilized adhering to the principles of ALARA.



FINDINGS: An 8 x 6 mm proximal right ureteral calculus results in moderate right

hydronephrosis. There is moderate perinephric infiltration. No additional

ureteral calculi are present. A 5 mm left renal calculus is present. A water

attenuation 3.4 cm lesion within the lower pole of the right kidney was shown to

represent a cyst on prior ultrasound. There are gallstones within the

gallbladder. There is no biliary or pancreatic ductal dilatation. Unenhanced

images of liver, spleen and adrenal glands are unremarkable. There is no

evidence for a bowel obstruction. The appendix is normal. There is colonic

diverticulosis without evidence for acute diverticulitis. The prostate gland is

surgically absent. There is no lymphadenopathy. There are postsurgical findings

within the spine. There are no suspicious osseous lesions. The appendix is

normal. Note is made of endovascular coils within left lower lobe arteriovenous

malformations. Postprocedural assessment is suboptimal on this unenhanced exam.



IMPRESSION:  



1. 8 mm x 6 mm proximal right ureteral calculus which results in moderate right

hydronephrosis.



2. 5 mm left renal calculus.



3. Cholelithiasis.



4. Colonic diverticulosis without evidence for acute diverticulitis.







Electronically signed by:  Blas Bloom M.D.

5/1/2018 6:52 AM



Dictated Date/Time:  5/1/2018 6:44 AM

## 2018-05-02 VITALS
HEART RATE: 61 BPM | DIASTOLIC BLOOD PRESSURE: 91 MMHG | TEMPERATURE: 98.06 F | OXYGEN SATURATION: 93 % | SYSTOLIC BLOOD PRESSURE: 151 MMHG

## 2018-05-02 VITALS
OXYGEN SATURATION: 94 % | SYSTOLIC BLOOD PRESSURE: 151 MMHG | TEMPERATURE: 98.42 F | DIASTOLIC BLOOD PRESSURE: 80 MMHG | HEART RATE: 66 BPM

## 2018-05-02 VITALS
HEART RATE: 61 BPM | OXYGEN SATURATION: 93 % | DIASTOLIC BLOOD PRESSURE: 91 MMHG | TEMPERATURE: 98.06 F | SYSTOLIC BLOOD PRESSURE: 151 MMHG

## 2018-05-02 LAB
BASOPHILS # BLD: 0.03 K/UL (ref 0–0.2)
BASOPHILS NFR BLD: 0.3 %
BUN SERPL-MCNC: 19 MG/DL (ref 7–18)
CALCIUM SERPL-MCNC: 7.9 MG/DL (ref 8.5–10.1)
CO2 SERPL-SCNC: 26 MMOL/L (ref 21–32)
CREAT SERPL-MCNC: 1.31 MG/DL (ref 0.6–1.4)
EOS ABS #: 0.2 K/UL (ref 0–0.5)
EOSINOPHIL NFR BLD AUTO: 157 K/UL (ref 130–400)
GLUCOSE SERPL-MCNC: 97 MG/DL (ref 70–99)
HCT VFR BLD CALC: 34.8 % (ref 42–52)
HGB BLD-MCNC: 12 G/DL (ref 14–18)
IG#: 0.02 K/UL (ref 0–0.02)
IMM GRANULOCYTES NFR BLD AUTO: 17.1 %
LYMPHOCYTES # BLD: 1.51 K/UL (ref 1.2–3.4)
MCH RBC QN AUTO: 32.5 PG (ref 25–34)
MCHC RBC AUTO-ENTMCNC: 34.5 G/DL (ref 32–36)
MCV RBC AUTO: 94.3 FL (ref 80–100)
MONO ABS #: 0.8 K/UL (ref 0.11–0.59)
MONOCYTES NFR BLD: 9.1 %
NEUT ABS #: 6.27 K/UL (ref 1.4–6.5)
NEUTROPHILS # BLD AUTO: 2.3 %
NEUTROPHILS NFR BLD AUTO: 71 %
PMV BLD AUTO: 11.6 FL (ref 7.4–10.4)
POTASSIUM SERPL-SCNC: 3.5 MMOL/L (ref 3.5–5.1)
RED CELL DISTRIBUTION WIDTH CV: 12.6 % (ref 11.5–14.5)
RED CELL DISTRIBUTION WIDTH SD: 42.8 FL (ref 36.4–46.3)
SODIUM SERPL-SCNC: 142 MMOL/L (ref 136–145)
WBC # BLD AUTO: 8.83 K/UL (ref 4.8–10.8)

## 2018-05-02 RX ADMIN — CEFTRIAXONE SODIUM SCH MLS/HR: 1 INJECTION, POWDER, FOR SOLUTION INTRAVENOUS at 01:17

## 2018-05-02 RX ADMIN — PANTOPRAZOLE SODIUM SCH MLS/MIN: 40 INJECTION, POWDER, FOR SOLUTION INTRAVENOUS at 11:03

## 2018-05-02 RX ADMIN — SODIUM CHLORIDE SCH MLS/HR: 900 INJECTION, SOLUTION INTRAVENOUS at 01:17

## 2018-05-02 NOTE — DISCHARGE SUMMARY
Discharge Summary


Date of Service


May 2, 2018.





Discharge Summary


Admission Date:


Apr 30, 2018 at 23:53


Discharge Date:  May 2, 2018


Discharge Disposition:  Home


Principal Diagnosis:  R ureteral stone, R hydronephrosis


Problems/Secondary Diagnoses:


Medical Problems:


(1) EDWARD (acute kidney injury)


(2) Hydronephrosis of right kidney


(3) Lumbar stenosis with neurogenic claudication


(4) Right ureteral calculus


Hx prostate cancer


Chronic tobacco use


HTN


HLD


Hx Stroke with hx left sided weakness now resolved


Immunizations:  


   Have You Had Influenza Vaccine:  No


   History of Tetanus Vaccine?:  Unknown


   History of Pneumococcal:  Unknown


   History of Hepatitis B Vaccine:  Unknown


Procedures:


5/1/18: s/p R ureteral stent insertion for right renal colic and obstructing 

ureteral calc





GALLBLADDER-ABD LIMITED 4/30/18


IMPRESSION:  


1. Several gallstones within the gallbladder neck.


2. Normal caliber bile ducts.


3. Mild fatty infiltration of liver. 


4. Mild fullness of the right renal collecting system which may be related to a


congenital extrarenal pelvis, although prior studies are not available for


comparison.. 





CT OF THE ABDOMEN AND PELVIS WITHOUT CONTRAST, STONE PROTOCOL 5/1/18


IMPRESSION:  


1. 8 mm x 6 mm proximal right ureteral calculus which results in moderate right


hydronephrosis.


2. 5 mm left renal calculus.


3. Cholelithiasis.


4. Colonic diverticulosis without evidence for acute diverticulitis.





KUB 5/1/18


IMPRESSION:  No change in position of an 8 mm proximal right ureteral calculus. 





CHEST 2 VIEWS ROUTINE 5/1/18


FINDINGS: Several endovascular coils are noted within the left lower lobe. No


pneumothorax or pleural effusion is noted. There is no evidence for pulmonary


edema. Cardiomediastinal silhouette is within normal limits. 


IMPRESSION:  No acute cardiopulmonary findings. 





KUB 5/1/18


FINDINGS: 


Single image demonstrates the proximal portion of a right ureteral stent which


has been placed in the interval. Previously described ureteral calculus is not


seen on this image. Cholelithiasis. Degenerative changes of the imaged spine are


noted.


IMPRESSION: Fluoroscopic assistance as above. Please see procedural report for


further details.


Consultations:


Urology





Medication Reconciliation


New Medications:  


Oxybutynin Chloride (Ditropan) 5 Mg Tab


1 TAB PO BID PRN for bladder pain, #20 TAB 0 Refills





Oxycodone HCl (Oxycodone HCl) 5 Mg Tab


5 MG PO Q6H PRN for Pain, #15 TABS 0 Refills





Phenazopyridine Hcl (Pyridium) 100 Mg Tab


100 MG PO TID PRN for urinary discomfort, #15 TAB 0 Refills


this product may turn your urine or tears orange


 


Continued Medications:  


Amoxicillin (Amoxil) 500 Mg Cap


500 MG PO UD PRN for Prior to Dental Procedures, CAP





Cholecalciferol (Vitamin D3) 2,000 Unit Cap


2000 INTER.UNIT PO DAILY, CAP





Cyanocobalamin (Vitamin B12) 1,000 Mcg Tab


1000 MCG PO QAM





Ferrous Sulfate (Ferrous Sulfate) 27 Mg Tab


27 MG PO DAILY





Hctz/Losartan (Hyzaar 12.5MG/50MG)  Tab


1 TAB PO DAILY, TAB





Multivitamin (Multivitamin)  Tab


1 TAB PO QAM





Pravastatin Sod (Pravastatin Sodium) 10 Mg Tab


10 MG PO HS





Zinc Gluconate (Zinc) 50 Mg Tab


50 MG PO HS





 


Discontinued Medications:  


Aspirin Enteric Coated (Ecotrin Or Generic) 81 Mg Tab


81 MG PO QAM





Sulindac (Clinoril) 200 Mg Tab


200 MG PO BID, TAB











Discharge Exam


The patient was seen and examined this morning. Pt reports doing well today. He 

has minimal dysuria at the end of urination stream. Denies any hematuria. No 

flank or abdominal pain.  


Pt anticipating discharge to home today. 





ROS:


   Constitutional:  No fever, No chills, No sweats, No fatigue


   Eyes:  No redness, No diplopia


   ENT:  No nasal symptoms, No sore throat


   Respiratory:  + problem reported (hx smoking x 28 yrs, 5-6 cigs a day), No 

cough, No sputum, No shortness of breath


   Cardiovascular:  No chest pain, No edema


   Abdomen:  No pain, No nausea, No vomiting, No diarrhea, No constipation


   Musculoskeletal:  + swelling, No muscle pain


   Male :  + see HPI


   Neurologic:  No weakness, No numbness/tingling





PE: 


General Appearance:  WD/WN, no apparent distress


Eyes:  PERRL, EOMI


ENT:  hearing grossly normal, pharynx normal


Neck:  no adenopathy, no JVD


Respiratory/Chest:  lungs clear, no respiratory distress, no accessory muscle 

use, + pertinent finding (on RA)


Cardiovascular:  regular rate, rhythm, no murmur


Abdomen:  normal bowel sounds, non tender, soft


Extremities:  non-tender, no calf tenderness, + pedal edema (1+ pitting edema 

in the LLE, trace pitting in the RLE)


Neurologic/Psychiatric:  alert, normal mood/affect, oriented x 3


Skin:  normal color, warm/dry





Hospital Course


64 yo M with 7 mm right proximal ureteral stone/right hydronephrosis--





R proximal ureteral stone


R hydronephrosis


- CT abd showing 8x6mm stone  - s/p stenting by urology


- Ceftriaxone 1 g IV daily (started 4/30) - no need to continue antibiotics as 

UA essentially negative - pcp/urology to follow culture


- Given pyridium and ditropan upon discharge


- NSS at 80 ml/hr during admission - pt tolerated diet w/o issues. Cr improved. 

EDWARD resolved. 


- Consult urology - planned for stent placement today, continue NPO until after 

procedure





Acute kidney injury/hypertension--


Creatinine 1.67 upon admission - improved back down to 1.3


- Follow PRP and magnesium serially. 


- Resume HCTZ/losartan Cont aspirin


- STOP sulindac - should not take this prior to any procedure (ie stent removal)





HTN


Hx Stroke with hx left sided weakness now resolved


Pulmonary Atrioventricular fistula


- BP slightly elevated in 140s/90s - resume hyzaar





Hyperlipidemia--


- hold pravastatin while n.p.o.





Chronic tobacco use


- smokes cigarettes x 28 years at this point, denies willingness to quit 

currently, offered nicotine patch however he declines at this time. 





Vitamin B12 deficiency--


- Hold cyanocobalamin 1000 mcg p.o. every morning until taking p.o.





Hx prostate cancer- stable





Chronic lower back pain


- stable





DVT ppx: teds, scds, ambulatory





CODE: FULL 





Disposition: From home. Dc home today


Total Time Spent:  Greater than 30 minutes


This includes examination of the patient, discharge planning, medication 

reconciliation, and communication with other providers.





Discharge Instructions


Please refer to the electronic Patient Visit Report (Discharge Instructions) 

for additional information.





Follow-Up


Follow up with your Primary Care Provider within 1 week. 


Follow up with urology as already scheduled on Friday.





Additional Copies To


Jhonatan Tabor Jr,CRISTIAN

## 2018-05-02 NOTE — DISCHARGE INSTRUCTIONS
Discharge Instructions


Date of Service


May 2, 2018.





Admission


Reason for Admission:  Hydronephrosis Of Rt Kidney, Rt Ureteral Calc





Discharge


Discharge Diagnosis / Problem:  R ureteral calculus, R hydronephrosis





Discharge Goals


Goal(s):  Decrease discomfort, Improve function, Increase independence, Improve 

disease control





Activity Recommendations


Activity Limitations:  resume your previous activity


Lifting Limitations:  none, gradually increase as tolerated


Exercise/Sports Limitations:  rest today, gradually increase as tolerated


May Resume Sexual Activity:  when tolerated


Shower/Bathe:  no limitations


Driving or Machine Use:  no limitations





.





Instructions / Follow-Up


Instructions / Follow-Up


You were admitted to [facility] with R ureteral stone and R hydronephrosis and 

diagnosed with the same as well as acute kidney injury. 


During your stay here you were evaluated by Urology and underwent stent 

placement by Dr. Alvarado on 5/1/18.  This stent will need to be removed at a 

later date, please discuss the timing of this with urology upon follow up 

appointment. 


Your kidney function improved prior to discharge.


You were treated with intravenous fluids, antibiotics and pain medications.  

You did not need any antibiotics at time of discharge.  





Medications:


Continue taking your other medications as prescribed.


Stop taking sulindac until after any procedure being performed by Urology.  

This is an NSAID and can potentially increase your risk of bleeding.  You may 

use tylenol as needed in the interim.  





Appointments:


Follow up with PCP within 1 week.


Follow up with urology as already scheduled within 1 week. Discuss stent 

removal with urology at your follow up.





Current Hospital Diet


Patient's current hospital diet: Regular Diet





Discharge Diet


Recommended Diet:  Regular Diet





Procedures


Procedures Performed:  


Cystoscopy, Right Ureteral Stent Insertion (6 French by 22-32cm)





Pending Studies


Studies pending at discharge:  yes


List of pending studies:  


Urine Culture - have urology follow result and discuss at your follow up


appointment this Friday.





Medical Emergencies








.


Who to Call and When:





Medical Emergencies:  If at any time you feel your situation is an emergency, 

please call 911 immediately.





.





Non-Emergent Contact


Non-Emergency issues call your:  Primary Care Provider, Urologist


Call Non-Emergent contact if:  you have a fever, temperature is above 100.5, 

your pain is not controlled, your pain is worsening, your pain is unusual for 

you, your pain is concerning you, you have any medication questions





.





Past History


Medical & Surgical History:  


(1) EDWARD (acute kidney injury)


(2) Right ureteral calculus


(3) Hydronephrosis of right kidney


.








"Provider Documentation" section prepared by Violet Helms.








.





[place]


Search Results:  no issues identified

## 2018-05-02 NOTE — PROGRESS NOTE
Subjective


Date of Service:


May 2, 2018.


Subjective


Pt evaluation today including:  conversation w/ patient, physical exam, lab 

review


Voiding:  no voiding problems


Pt doing well after right ureteral stent insertion yesterday


- minimal stent bother (only when voiding)


- no renal colic, etc


- voiding adequately





Review of Systems


Abdomen:  No pain, No nausea


Male :  + problem reported, No dysuria, No urinary frequency, No hematuria





Objective


Vital Signs











  Date Time  Temp Pulse Resp B/P (MAP) Pulse Ox O2 Delivery O2 Flow Rate FiO2


 


5/2/18 07:25      Room Air  


 


5/2/18 07:01 36.7 61 18 151/91 (111) 93 Room Air  


 


5/2/18 04:00 36.9 66 18 151/80 (103) 94 Room Air  


 


5/1/18 23:45      Room Air  


 


5/1/18 23:06 36.7 66 20 151/86 (107) 92 Room Air  


 


5/1/18 20:05 37.0 59 18 160/88 (112) 94 Room Air  


 


5/1/18 19:15 36.7 64 18 153/86 (108) 94 Room Air  


 


5/1/18 18:16 36.8 62 18 165/84 (111) 92 Room Air  


 


5/1/18 17:30 36.7 58 18 155/102 (119) 94 Room Air  


 


5/1/18 17:00      Room Air  


 


5/1/18 17:00 36.5 63 16 150/91 (110) 93 Room Air  


 


5/1/18 17:00     93 Room Air  


 


5/1/18 16:52  59 7     


 


5/1/18 16:52  60 7  96   


 


5/1/18 16:51    159/93    


 


5/1/18 16:47  61 10  94   


 


5/1/18 16:47  63 10     


 


5/1/18 16:46    145/89    


 


5/1/18 16:44 37.0 62 16 145/89 (108) 95 Nasal Cannula 2 


 


5/1/18 16:42  68 14  95   


 


5/1/18 16:42  68 14     


 


5/1/18 16:41    153/104    


 


5/1/18 16:37  63 19  100   


 


5/1/18 16:37  62 19     


 


5/1/18 16:36  67 17     


 


5/1/18 16:36  66 17 143/89 100   


 


5/1/18 16:36  67 17     


 


5/1/18 16:36  66 17 143/89 100   


 


5/1/18 16:33    133/91    


 


5/1/18 16:33    133/91    


 


5/1/18 16:31  67 10     


 


5/1/18 16:31  66 10 143/111 100   


 


5/1/18 16:31  66 10 143/111 100   


 


5/1/18 16:31  67 10     


 


5/1/18 16:27    131/89    


 


5/1/18 16:27    131/89    


 


5/1/18 16:26  72 13  98   


 


5/1/18 16:26  71 13     


 


5/1/18 16:26  72 13  98   


 


5/1/18 16:26 36.8 69 16 131/89 (110) 98 Nasal Cannula 2 


 


5/1/18 16:26  71 13     


 


5/1/18 15:17 37.1 65 16 146/92 (110) 95 Room Air  











Physical Exam


General Appearance:  WD/WN, no apparent distress


Eyes:  normal inspection


ENT:  hearing grossly normal


Neck:  no adenopathy


Respiratory/Chest:  no respiratory distress


Cardiovascular:  no edema


Abdomen:  non tender, soft


Extremities:  no pedal edema, no calf tenderness


Neurologic/Psychiatric:  alert, normal mood/affect, oriented x 3





Laboratory Results





Last 24 Hours








Test


  5/2/18


07:13 5/2/18


08:17


 


White Blood Count 8.83 K/uL  


 


Red Blood Count 3.69 M/uL  


 


Hemoglobin 12.0 g/dL  


 


Hematocrit 34.8 %  


 


Mean Corpuscular Volume 94.3 fL  


 


Mean Corpuscular Hemoglobin 32.5 pg  


 


Mean Corpuscular Hemoglobin


Concent 34.5 g/dl 


  


 


 


Platelet Count 157 K/uL  


 


Mean Platelet Volume 11.6 fL  


 


Neutrophils (%) (Auto) 71.0 %  


 


Lymphocytes (%) (Auto) 17.1 %  


 


Monocytes (%) (Auto) 9.1 %  


 


Eosinophils (%) (Auto) 2.3 %  


 


Basophils (%) (Auto) 0.3 %  


 


Neutrophils # (Auto) 6.27 K/uL  


 


Lymphocytes # (Auto) 1.51 K/uL  


 


Monocytes # (Auto) 0.80 K/uL  


 


Eosinophils # (Auto) 0.20 K/uL  


 


Basophils # (Auto) 0.03 K/uL  


 


RDW Standard Deviation 42.8 fL  


 


RDW Coefficient of Variation 12.6 %  


 


Immature Granulocyte % (Auto) 0.2 %  


 


Immature Granulocyte # (Auto) 0.02 K/uL  


 


Sodium Level 142 mmol/L  


 


Potassium Level  mmol/L  3.5 mmol/L 


 


Chloride Level 111 mmol/L  


 


Carbon Dioxide Level 26 mmol/L  


 


Anion Gap 5.0 mmol/L  


 


Blood Urea Nitrogen 19 mg/dl  


 


Creatinine 1.31 mg/dl  


 


Est Creatinine Clear Calc


Drug Dose 71.5 ml/min 


  


 


 


Estimated GFR (


American) 66.7 


  


 


 


Estimated GFR (Non-


American 57.5 


  


 


 


BUN/Creatinine Ratio 14.3  


 


Random Glucose 97 mg/dl  


 


Calcium Level 7.9 mg/dl  


 


Magnesium Level  mg/dl  1.9 mg/dl 











Assessment and Plan


POD #1 s/p R ureteral stent insertion for right renal colic and obstructing 

ureteral calc


- stable for d/c home from a  standpoint


- my office will contact him today to arrange f/u

## 2018-05-11 ENCOUNTER — HOSPITAL ENCOUNTER (OUTPATIENT)
Dept: HOSPITAL 45 - C.RAD1850 | Age: 63
Discharge: HOME | End: 2018-05-11
Attending: UROLOGY
Payer: COMMERCIAL

## 2018-05-11 ENCOUNTER — HOSPITAL ENCOUNTER (OUTPATIENT)
Dept: HOSPITAL 45 - X.SURG | Age: 63
Discharge: HOME | End: 2018-05-11
Attending: UROLOGY
Payer: COMMERCIAL

## 2018-05-11 VITALS
BODY MASS INDEX: 35.15 KG/M2 | HEIGHT: 70 IN | BODY MASS INDEX: 35.15 KG/M2 | WEIGHT: 245.51 LBS | WEIGHT: 245.51 LBS | HEIGHT: 70 IN

## 2018-05-11 VITALS — DIASTOLIC BLOOD PRESSURE: 83 MMHG | OXYGEN SATURATION: 97 % | SYSTOLIC BLOOD PRESSURE: 135 MMHG | HEART RATE: 57 BPM

## 2018-05-11 DIAGNOSIS — H49.10: ICD-10-CM

## 2018-05-11 DIAGNOSIS — N20.0: Primary | ICD-10-CM

## 2018-05-11 DIAGNOSIS — Z85.46: ICD-10-CM

## 2018-05-11 DIAGNOSIS — E78.5: ICD-10-CM

## 2018-05-11 DIAGNOSIS — Z79.82: ICD-10-CM

## 2018-05-11 DIAGNOSIS — Z87.891: ICD-10-CM

## 2018-05-11 DIAGNOSIS — I10: ICD-10-CM

## 2018-05-11 DIAGNOSIS — Z86.73: ICD-10-CM

## 2018-05-11 NOTE — DISCHARGE INSTRUCTIONS
Discharge Instructions


Date of Service


May 11, 2018.





Admission


Reason for Admission:  Stones





Discharge


Discharge Diagnosis / Problem:  R renal stone with indwelling stent s/p ESWL





Discharge Goals


Goal(s):  Decrease discomfort, Improve disease control, Therapeutic intervention





Activity Recommendations


Activity Limitations:  as noted below


Lifting Limitations:  no more than 25 pounds, gradually increase as tolerated


Exercise/Sports Limitations:  rest today, gradually increase as tolerated


May Resume Sexual Activity:  when tolerated


Shower/Bathe:  no limitations





.





Instructions / Follow-Up


Instructions / Follow-Up


Follow-up in office with KUB Xray before visit as planned.





Current Hospital Diet


Patient's current hospital diet:





Discharge Diet


Recommended Diet:  Regular Diet (good fluid intake)





Procedures


Procedures Performed:  


Right renal extracorporeal shockwave lithotripsy





Pending Studies


Studies pending at discharge:  yes


List of pending studies:  


KUB Xray on follow-up in office





Medical Emergencies








.


Who to Call and When:





Medical Emergencies:  If at any time you feel your situation is an emergency, 

please call 911 immediately.





.





Non-Emergent Contact


Non-Emergency issues call your:  Urologist


Call Non-Emergent contact if:  you have a fever, temperature is above 101, your 

pain is not controlled, your pain is worsening, your pain is unusual for you, 

your pain is concerning you, you have any medication questions





.


.








"Provider Documentation" section prepared by Vikash Breen.








.





[place]


Search Results:  patient reviewed within database, see additional documentation 

(no recent Rx in 2018 - provided with Rx for postop analgesia)

## 2018-05-11 NOTE — [facility] POST OPERATIVE BRIEF NOTE
Immediate Operative Summary


Operative Date


May 11, 2018.





Pre-Operative Diagnosis





Right Renal Kidney Stone





Post-Operative Diagnosis





Same as pre-op





Procedure(s) Performed





Right Extracorporeal Shock Wave Lithotripsy-Renal





Surgeon








Assistant Surgeon(s)


None





Estimated Blood Loss


Zero





Findings


Consistent with Post-Op Diagnosis





Specimens





None





Drains


None





Anesthesia Type


General





Complication(s)


none





Disposition


Accompanied Pt To Recover:  no


Disposition:  Recovery Room / PACU

## 2018-05-11 NOTE — DIAGNOSTIC IMAGING REPORT
KUB



CLINICAL HISTORY: N20.0 Nephrolithiasis nephrocalcinosis



COMPARISON STUDY:  5/1/2018 



FINDINGS: Right ureteral stent in good position. The proximal right ureteral

calculus appears to have been displaced in a retrograde fashion is now located

in the lower pole of the right kidney. Left-sided nephrocalcinosis is unchanged.

Several pelvic vascular calcifications are unaltered.



IMPRESSION:  Interval placement of a right ureteral stent with the proximal

right ureteral calculus previously described now located at the lower pole the

right kidney. Unchanging left nephrocalcinosis. 













The above report was generated using voice recognition software.  It may contain

grammatical, syntax or spelling errors.







Electronically signed by:  Dante Barnett M.D.

5/11/2018 9:03 AM



Dictated Date/Time:  5/11/2018 9:01 AM

## 2018-05-11 NOTE — ANESTHESIA PROGRESS NT - [facility]
Anesthesia Post Op Note


Date & Time


May 11, 2018 at 12:21





Vital Signs


Pain Intensity:  0





Vital Signs Past 12 Hours








  Date Time  Temp Pulse Resp B/P (MAP) Pulse Ox O2 Delivery O2 Flow Rate FiO2


 


5/11/18 12:15 36.8 53 22 126/80 (95) 95 Room Air  


 


5/11/18 12:11  57  121/77 96   


 


5/11/18 12:11  57      


 


5/11/18 12:06  67 16     


 


5/11/18 12:06  67 16  90   


 


5/11/18 12:05 36.4    94 Room Air  


 


5/11/18 12:05    130/83    


 


5/11/18 12:01  72 19  92   


 


5/11/18 12:01  72 19     


 


5/11/18 12:00    133/89    


 


5/11/18 11:59  77   87   


 


5/11/18 11:59  77      


 


5/11/18 11:55    125/74    


 


5/11/18 11:54  57 15     


 


5/11/18 11:54  58 15  98   


 


5/11/18 11:50    109/68    


 


5/11/18 11:49  63 15     


 


5/11/18 11:49  61 15  98   


 


5/11/18 11:48  58 15  99   


 


5/11/18 11:48  56 15     


 


5/11/18 11:46    102/72    


 


5/11/18 11:43  64 22 119/71 93   


 


5/11/18 11:43 36.5 62 16 119/71 96 Room Air  


 


5/11/18 11:43  64 22     


 


5/11/18 09:57 36.4 56 20 127/86 (100) 94 Room Air  











Notes


Mental Status:  alert / awake / arousable, participated in evaluation


Pt Amnestic to Procedure:  Yes


Nausea / Vomiting:  adequately controlled


Pain:  adequately controlled


Airway Patency, RR, SpO2:  stable & adequate


BP & HR:  stable & adequate


Hydration State:  stable & adequate


Anesthetic Complications:  no major complications apparent

## 2018-05-11 NOTE — OPERATIVE REPORT
DATE OF OPERATION:  05/11/2018

 

PREOPERATIVE DIAGNOSIS:  Right renal stone with an indwelling stent.

 

POSTOPERATIVE DIAGNOSIS:  Right renal stone with an indwelling stent.

 

PROCEDURE:  Right renal extracorporeal shock wave lithotripsy.

 

SURGEON:  Dr. Vikash Breen

 

ASSISTANTS:  None.

 

ANESTHESIA:  General anesthesia with laryngeal mask.

 

COMPLICATIONS:  None.

 

FINDINGS:  Excellent stone fragmentation on fluoroscopy.

 

DETAILS OF PROCEDURE:  The patient was brought to the litho suite. He was

correctly identified and the stone was visualized on his most recent x-rays. 

After the correct time out was performed the patient was positioned over the

therapy head.  An adequate level of anesthesia was administered.  The

extracorporeal shockwave lithotripsy treatment was then commenced.  Please

see the [employer] sheet for complete treatment

summary.  After completion of the procedure, the patient was taken to the

recovery room in stable condition.

 

 

I attest to the content of the Intraoperative Record and any orders documented therein. Any exception
s are noted below.

## 2018-05-21 ENCOUNTER — HOSPITAL ENCOUNTER (OUTPATIENT)
Dept: HOSPITAL 45 - C.RAD1850 | Age: 63
Discharge: HOME | End: 2018-05-21
Attending: UROLOGY
Payer: COMMERCIAL

## 2018-05-21 ENCOUNTER — HOSPITAL ENCOUNTER (OUTPATIENT)
Dept: HOSPITAL 45 - C.LABSPEC | Age: 63
Discharge: HOME | End: 2018-05-21
Attending: UROLOGY
Payer: COMMERCIAL

## 2018-05-21 DIAGNOSIS — Z96.0: ICD-10-CM

## 2018-05-21 DIAGNOSIS — N20.0: Primary | ICD-10-CM

## 2018-05-21 NOTE — DIAGNOSTIC IMAGING REPORT
KUB



CLINICAL HISTORY: 63 years-old Male presenting with N20.0

[id number]. 



TECHNIQUE: Single supine view of the abdomen was obtained.



COMPARISON: 5/11/2018 and CT from 4/30/2018.



FINDINGS:

Cholelithiasis. Nonobstructive bowel gas pattern. No gross pneumoperitoneum.

Endovascular coils project over the epigastrium.



The right ureteral stent remains in place. Redemonstration of a calculus at the

lower pole of the right kidney as well as a calculus at the interpolar to upper

pole of the left kidney. Stable distribution of pelvic phleboliths.



Surgical clips project over the pelvis likely indicating prior prostatectomy.



Posterior lumbar fusion of L4-S1 with interbody spacer and laminectomy defects.

Lung bases clear. 



IMPRESSION:

1.  Right ureteral stent remains in place with stable bilateral nephrolithiasis.

No ureteral calculi.







Electronically signed by:  Gonzales Cee M.D.

5/21/2018 9:28 AM



Dictated Date/Time:  5/21/2018 9:25 AM